# Patient Record
Sex: FEMALE | Race: WHITE | NOT HISPANIC OR LATINO | Employment: OTHER | URBAN - METROPOLITAN AREA
[De-identification: names, ages, dates, MRNs, and addresses within clinical notes are randomized per-mention and may not be internally consistent; named-entity substitution may affect disease eponyms.]

---

## 2017-10-26 ENCOUNTER — TRANSCRIBE ORDERS (OUTPATIENT)
Dept: LAB | Facility: HOSPITAL | Age: 57
End: 2017-10-26

## 2017-10-26 ENCOUNTER — LAB (OUTPATIENT)
Dept: LAB | Facility: HOSPITAL | Age: 57
End: 2017-10-26

## 2017-10-26 DIAGNOSIS — E28.1 HYPERSECRETION OF OVARIAN ANDROGENS: Primary | ICD-10-CM

## 2017-10-26 DIAGNOSIS — E28.1 HYPERSECRETION OF OVARIAN ANDROGENS: ICD-10-CM

## 2017-10-26 LAB — TESTOST SERPL-MCNC: <10 NG/DL

## 2017-10-26 PROCEDURE — 84403 ASSAY OF TOTAL TESTOSTERONE: CPT | Performed by: OBSTETRICS & GYNECOLOGY

## 2017-10-26 PROCEDURE — 36415 COLL VENOUS BLD VENIPUNCTURE: CPT

## 2017-10-26 PROCEDURE — 84402 ASSAY OF FREE TESTOSTERONE: CPT | Performed by: OBSTETRICS & GYNECOLOGY

## 2017-10-27 LAB — TESTOST FREE SERPL-MCNC: <0.2 PG/ML (ref 0–4.2)

## 2018-01-03 ENCOUNTER — HOSPITAL ENCOUNTER (OUTPATIENT)
Facility: HOSPITAL | Age: 58
Setting detail: OBSERVATION
Discharge: HOME | End: 2018-01-03
Attending: EMERGENCY MEDICINE | Admitting: HOSPITALIST

## 2018-01-03 ENCOUNTER — APPOINTMENT (OUTPATIENT)
Dept: CARDIOLOGY | Facility: HOSPITAL | Age: 58
End: 2018-01-03

## 2018-01-03 ENCOUNTER — APPOINTMENT (OUTPATIENT)
Dept: GENERAL RADIOLOGY | Facility: HOSPITAL | Age: 58
End: 2018-01-03

## 2018-01-03 VITALS
SYSTOLIC BLOOD PRESSURE: 121 MMHG | DIASTOLIC BLOOD PRESSURE: 79 MMHG | WEIGHT: 137.38 LBS | HEIGHT: 64 IN | RESPIRATION RATE: 16 BRPM | OXYGEN SATURATION: 96 % | TEMPERATURE: 97.8 F | HEART RATE: 107 BPM | BODY MASS INDEX: 23.45 KG/M2

## 2018-01-03 DIAGNOSIS — R07.9 CHEST PAIN, UNSPECIFIED TYPE: Primary | ICD-10-CM

## 2018-01-03 PROBLEM — K31.84 GASTROPARESIS: Status: ACTIVE | Noted: 2018-01-03

## 2018-01-03 PROBLEM — Z72.0 TOBACCO ABUSE: Status: ACTIVE | Noted: 2018-01-03

## 2018-01-03 PROBLEM — K21.9 GERD (GASTROESOPHAGEAL REFLUX DISEASE): Status: ACTIVE | Noted: 2018-01-03

## 2018-01-03 LAB
ALBUMIN SERPL-MCNC: 4.3 G/DL (ref 3.2–4.8)
ALBUMIN/GLOB SERPL: 1.5 G/DL (ref 1.5–2.5)
ALP SERPL-CCNC: 69 U/L (ref 25–100)
ALT SERPL W P-5'-P-CCNC: 27 U/L (ref 7–40)
ANION GAP SERPL CALCULATED.3IONS-SCNC: 6 MMOL/L (ref 3–11)
ANION GAP SERPL CALCULATED.3IONS-SCNC: 9 MMOL/L (ref 3–11)
ARTICHOKE IGE QN: 192 MG/DL (ref 0–130)
AST SERPL-CCNC: 33 U/L (ref 0–33)
BASOPHILS # BLD AUTO: 0.01 10*3/MM3 (ref 0–0.2)
BASOPHILS NFR BLD AUTO: 0.1 % (ref 0–1)
BILIRUB SERPL-MCNC: 0.2 MG/DL (ref 0.3–1.2)
BNP SERPL-MCNC: 8 PG/ML (ref 0–100)
BUN BLD-MCNC: 8 MG/DL (ref 9–23)
BUN BLD-MCNC: 8 MG/DL (ref 9–23)
BUN/CREAT SERPL: 11.4 (ref 7–25)
BUN/CREAT SERPL: 11.4 (ref 7–25)
CALCIUM SPEC-SCNC: 9.2 MG/DL (ref 8.7–10.4)
CALCIUM SPEC-SCNC: 9.4 MG/DL (ref 8.7–10.4)
CHLORIDE SERPL-SCNC: 101 MMOL/L (ref 99–109)
CHLORIDE SERPL-SCNC: 103 MMOL/L (ref 99–109)
CHOLEST SERPL-MCNC: 282 MG/DL (ref 0–200)
CO2 SERPL-SCNC: 28 MMOL/L (ref 20–31)
CO2 SERPL-SCNC: 30 MMOL/L (ref 20–31)
CREAT BLD-MCNC: 0.7 MG/DL (ref 0.6–1.3)
CREAT BLD-MCNC: 0.7 MG/DL (ref 0.6–1.3)
DEPRECATED RDW RBC AUTO: 47 FL (ref 37–54)
EOSINOPHIL # BLD AUTO: 0.18 10*3/MM3 (ref 0–0.3)
EOSINOPHIL NFR BLD AUTO: 2.2 % (ref 0–3)
ERYTHROCYTE [DISTWIDTH] IN BLOOD BY AUTOMATED COUNT: 12.9 % (ref 11.3–14.5)
GFR SERPL CREATININE-BSD FRML MDRD: 86 ML/MIN/1.73
GFR SERPL CREATININE-BSD FRML MDRD: 86 ML/MIN/1.73
GLOBULIN UR ELPH-MCNC: 2.9 GM/DL
GLUCOSE BLD-MCNC: 121 MG/DL (ref 70–100)
GLUCOSE BLD-MCNC: 137 MG/DL (ref 70–100)
HBA1C MFR BLD: 6.1 % (ref 4.8–5.6)
HCT VFR BLD AUTO: 38.6 % (ref 34.5–44)
HDLC SERPL-MCNC: 47 MG/DL (ref 40–60)
HGB BLD-MCNC: 12 G/DL (ref 11.5–15.5)
HIV1+2 AB SER QL: NORMAL
HOLD SPECIMEN: NORMAL
HOLD SPECIMEN: NORMAL
IMM GRANULOCYTES # BLD: 0.02 10*3/MM3 (ref 0–0.03)
IMM GRANULOCYTES NFR BLD: 0.2 % (ref 0–0.6)
LIPASE SERPL-CCNC: 23 U/L (ref 6–51)
LYMPHOCYTES # BLD AUTO: 2.76 10*3/MM3 (ref 0.6–4.8)
LYMPHOCYTES NFR BLD AUTO: 33.1 % (ref 24–44)
MCH RBC QN AUTO: 30.8 PG (ref 27–31)
MCHC RBC AUTO-ENTMCNC: 31.1 G/DL (ref 32–36)
MCV RBC AUTO: 99.2 FL (ref 80–99)
MONOCYTES # BLD AUTO: 0.77 10*3/MM3 (ref 0–1)
MONOCYTES NFR BLD AUTO: 9.2 % (ref 0–12)
NEUTROPHILS # BLD AUTO: 4.61 10*3/MM3 (ref 1.5–8.3)
NEUTROPHILS NFR BLD AUTO: 55.2 % (ref 41–71)
PLATELET # BLD AUTO: 354 10*3/MM3 (ref 150–450)
PMV BLD AUTO: 10 FL (ref 6–12)
POTASSIUM BLD-SCNC: 4 MMOL/L (ref 3.5–5.5)
POTASSIUM BLD-SCNC: 4.7 MMOL/L (ref 3.5–5.5)
PROT SERPL-MCNC: 7.2 G/DL (ref 5.7–8.2)
RBC # BLD AUTO: 3.89 10*6/MM3 (ref 3.89–5.14)
SODIUM BLD-SCNC: 138 MMOL/L (ref 132–146)
SODIUM BLD-SCNC: 139 MMOL/L (ref 132–146)
TRIGL SERPL-MCNC: 480 MG/DL (ref 0–150)
TROPONIN I SERPL-MCNC: 0 NG/ML (ref 0–0.07)
TROPONIN I SERPL-MCNC: 0.01 NG/ML (ref 0–0.07)
TROPONIN I SERPL-MCNC: <0.006 NG/ML
TSH SERPL DL<=0.05 MIU/L-ACNC: 4.22 MIU/ML (ref 0.35–5.35)
WBC NRBC COR # BLD: 8.35 10*3/MM3 (ref 3.5–10.8)
WHOLE BLOOD HOLD SPECIMEN: NORMAL
WHOLE BLOOD HOLD SPECIMEN: NORMAL

## 2018-01-03 PROCEDURE — 85025 COMPLETE CBC W/AUTO DIFF WBC: CPT | Performed by: EMERGENCY MEDICINE

## 2018-01-03 PROCEDURE — 84484 ASSAY OF TROPONIN QUANT: CPT | Performed by: NURSE PRACTITIONER

## 2018-01-03 PROCEDURE — 84484 ASSAY OF TROPONIN QUANT: CPT

## 2018-01-03 PROCEDURE — 93306 TTE W/DOPPLER COMPLETE: CPT

## 2018-01-03 PROCEDURE — G0432 EIA HIV-1/HIV-2 SCREEN: HCPCS | Performed by: HOSPITALIST

## 2018-01-03 PROCEDURE — 83880 ASSAY OF NATRIURETIC PEPTIDE: CPT | Performed by: EMERGENCY MEDICINE

## 2018-01-03 PROCEDURE — G0378 HOSPITAL OBSERVATION PER HR: HCPCS

## 2018-01-03 PROCEDURE — 96372 THER/PROPH/DIAG INJ SC/IM: CPT

## 2018-01-03 PROCEDURE — 93010 ELECTROCARDIOGRAM REPORT: CPT | Performed by: INTERNAL MEDICINE

## 2018-01-03 PROCEDURE — 83690 ASSAY OF LIPASE: CPT | Performed by: EMERGENCY MEDICINE

## 2018-01-03 PROCEDURE — 96374 THER/PROPH/DIAG INJ IV PUSH: CPT

## 2018-01-03 PROCEDURE — 80061 LIPID PANEL: CPT | Performed by: NURSE PRACTITIONER

## 2018-01-03 PROCEDURE — 93005 ELECTROCARDIOGRAM TRACING: CPT

## 2018-01-03 PROCEDURE — 80048 BASIC METABOLIC PNL TOTAL CA: CPT | Performed by: NURSE PRACTITIONER

## 2018-01-03 PROCEDURE — 93005 ELECTROCARDIOGRAM TRACING: CPT | Performed by: EMERGENCY MEDICINE

## 2018-01-03 PROCEDURE — 80053 COMPREHEN METABOLIC PANEL: CPT | Performed by: EMERGENCY MEDICINE

## 2018-01-03 PROCEDURE — 25010000002 ENOXAPARIN PER 10 MG: Performed by: NURSE PRACTITIONER

## 2018-01-03 PROCEDURE — 99285 EMERGENCY DEPT VISIT HI MDM: CPT

## 2018-01-03 PROCEDURE — 83036 HEMOGLOBIN GLYCOSYLATED A1C: CPT | Performed by: NURSE PRACTITIONER

## 2018-01-03 PROCEDURE — 71045 X-RAY EXAM CHEST 1 VIEW: CPT

## 2018-01-03 PROCEDURE — 93306 TTE W/DOPPLER COMPLETE: CPT | Performed by: INTERNAL MEDICINE

## 2018-01-03 PROCEDURE — 84443 ASSAY THYROID STIM HORMONE: CPT | Performed by: NURSE PRACTITIONER

## 2018-01-03 PROCEDURE — 99220 PR INITIAL OBSERVATION CARE/DAY 70 MINUTES: CPT | Performed by: INTERNAL MEDICINE

## 2018-01-03 RX ORDER — SODIUM CHLORIDE 0.9 % (FLUSH) 0.9 %
1-10 SYRINGE (ML) INJECTION AS NEEDED
Status: DISCONTINUED | OUTPATIENT
Start: 2018-01-03 | End: 2018-01-03 | Stop reason: HOSPADM

## 2018-01-03 RX ORDER — ONDANSETRON 2 MG/ML
4 INJECTION INTRAMUSCULAR; INTRAVENOUS EVERY 6 HOURS PRN
Status: DISCONTINUED | OUTPATIENT
Start: 2018-01-03 | End: 2018-01-03 | Stop reason: HOSPADM

## 2018-01-03 RX ORDER — ALUMINA, MAGNESIA, AND SIMETHICONE 2400; 2400; 240 MG/30ML; MG/30ML; MG/30ML
15 SUSPENSION ORAL ONCE
Status: COMPLETED | OUTPATIENT
Start: 2018-01-03 | End: 2018-01-03

## 2018-01-03 RX ORDER — ACETAMINOPHEN,DIPHENHYDRAMINE HCL 500; 25 MG/1; MG/1
1 TABLET, FILM COATED ORAL NIGHTLY PRN
Status: ON HOLD | COMMUNITY
End: 2020-02-11

## 2018-01-03 RX ORDER — ASPIRIN 81 MG/1
162 TABLET, CHEWABLE ORAL ONCE
Status: COMPLETED | OUTPATIENT
Start: 2018-01-03 | End: 2018-01-03

## 2018-01-03 RX ORDER — OXYCODONE AND ACETAMINOPHEN 10; 325 MG/1; MG/1
1 TABLET ORAL EVERY 6 HOURS PRN
Status: DISCONTINUED | OUTPATIENT
Start: 2018-01-03 | End: 2018-01-03 | Stop reason: HOSPADM

## 2018-01-03 RX ORDER — IMIPRAMINE HCL 25 MG
100 TABLET ORAL NIGHTLY
Status: DISCONTINUED | OUTPATIENT
Start: 2018-01-03 | End: 2018-01-03 | Stop reason: HOSPADM

## 2018-01-03 RX ORDER — FAMOTIDINE 10 MG/ML
20 INJECTION, SOLUTION INTRAVENOUS ONCE
Status: COMPLETED | OUTPATIENT
Start: 2018-01-03 | End: 2018-01-03

## 2018-01-03 RX ORDER — PANTOPRAZOLE SODIUM 40 MG/1
40 TABLET, DELAYED RELEASE ORAL DAILY
Status: DISCONTINUED | OUTPATIENT
Start: 2018-01-03 | End: 2018-01-03 | Stop reason: HOSPADM

## 2018-01-03 RX ORDER — CLONAZEPAM 1 MG/1
2 TABLET ORAL NIGHTLY
Status: DISCONTINUED | OUTPATIENT
Start: 2018-01-03 | End: 2018-01-03 | Stop reason: HOSPADM

## 2018-01-03 RX ORDER — SODIUM CHLORIDE 0.9 % (FLUSH) 0.9 %
10 SYRINGE (ML) INJECTION AS NEEDED
Status: DISCONTINUED | OUTPATIENT
Start: 2018-01-03 | End: 2018-01-03 | Stop reason: HOSPADM

## 2018-01-03 RX ORDER — PANTOPRAZOLE SODIUM 40 MG/1
40 TABLET, DELAYED RELEASE ORAL DAILY
COMMUNITY
End: 2019-02-24

## 2018-01-03 RX ORDER — ACETAMINOPHEN 325 MG/1
650 TABLET ORAL EVERY 6 HOURS PRN
Status: DISCONTINUED | OUTPATIENT
Start: 2018-01-03 | End: 2018-01-03 | Stop reason: HOSPADM

## 2018-01-03 RX ORDER — ONDANSETRON 4 MG/1
4 TABLET, ORALLY DISINTEGRATING ORAL EVERY 8 HOURS PRN
COMMUNITY

## 2018-01-03 RX ORDER — CLONAZEPAM 1 MG/1
2 TABLET ORAL DAILY
Status: DISCONTINUED | OUTPATIENT
Start: 2018-01-03 | End: 2018-01-03

## 2018-01-03 RX ORDER — ASPIRIN 81 MG/1
324 TABLET, CHEWABLE ORAL ONCE
Status: DISCONTINUED | OUTPATIENT
Start: 2018-01-03 | End: 2018-01-03 | Stop reason: DRUGHIGH

## 2018-01-03 RX ADMIN — LIDOCAINE HYDROCHLORIDE 15 ML: 20 SOLUTION ORAL; TOPICAL at 01:35

## 2018-01-03 RX ADMIN — ACETAMINOPHEN 650 MG: 325 TABLET, FILM COATED ORAL at 08:30

## 2018-01-03 RX ADMIN — OXYCODONE HYDROCHLORIDE AND ACETAMINOPHEN 1 TABLET: 10; 325 TABLET ORAL at 10:43

## 2018-01-03 RX ADMIN — PANTOPRAZOLE SODIUM 40 MG: 40 TABLET, DELAYED RELEASE ORAL at 08:30

## 2018-01-03 RX ADMIN — ASPIRIN 81 MG 162 MG: 81 TABLET ORAL at 01:28

## 2018-01-03 RX ADMIN — ALUMINUM HYDROXIDE, MAGNESIUM HYDROXIDE, AND DIMETHICONE 15 ML: 400; 400; 40 SUSPENSION ORAL at 01:35

## 2018-01-03 RX ADMIN — NITROGLYCERIN 1 INCH: 20 OINTMENT TOPICAL at 01:30

## 2018-01-03 RX ADMIN — FAMOTIDINE 20 MG: 10 INJECTION INTRAVENOUS at 01:31

## 2018-01-03 RX ADMIN — ENOXAPARIN SODIUM 40 MG: 40 INJECTION SUBCUTANEOUS at 05:59

## 2018-01-03 NOTE — PROGRESS NOTES
Discharge Planning Assessment  [facility]     Patient Name: Dawna Jolly  MRN: [id number]  Today's Date: 1/3/2018    Admit Date: 1/3/2018          Discharge Needs Assessment       01/03/18 1006    Living Environment    Lives With spouse    Living Arrangements house    Home Accessibility no concerns    Living Environment Comment Supportive spouse    Discharge Needs Assessment    Community Agency Name(S) No HH involved    Equipment Currently Used at Home none    Equipment Needed After Discharge none            Discharge Plan       01/03/18 1006    Case Management/Social Work Plan    Plan Home at DC    Patient/Family In Agreement With Plan yes    Additional Comments I spoke with the pt. She has no DC needs.        Discharge Placement     No information found        Expected Discharge Date and Time     Expected Discharge Date Expected Discharge Time    Jan 4, 2018               Demographic Summary     None            Functional Status       01/03/18 1005    Functional Status Prior    Ambulation 0-->independent    Transferring 0-->independent    Toileting 0-->independent    Bathing 0-->independent    Dressing 0-->independent    Eating 0-->independent    IADL    Medications independent    Meal Preparation independent    Housekeeping independent    Laundry independent    Shopping independent    Oral Care independent            Psychosocial     None            Abuse/Neglect     None            Legal     None            Substance Abuse     None            Patient Forms     None          Nayana Vargas RN

## 2018-01-03 NOTE — PLAN OF CARE
Problem: Health Knowledge, Opportunity for Enhanced (Adult,NICU,Mentor,Obstetrics,Pediatric)  Goal: Identify Related Risk Factors and Signs and Symptoms  Outcome: Ongoing (interventions implemented as appropriate)   18 0501   Health Knowledge, Opportunity for Enhanced   Health Knowledge, Opportunity for Enhanced: Related Risk Factors fatigue   Signs and Symptoms (Health Knowledge Enhance) information requested     Goal: Knowledgeable about Health Subject/Topic  Outcome: Ongoing (interventions implemented as appropriate)   18 050   Health Knowledge, Opportunity for Enhanced (Adult,NICU,,Obstetrics,Pediatric)   Knowledgeable about Health Subject/Topic making progress toward outcome       Problem: Patient Care Overview (Adult)  Goal: Plan of Care Review  Outcome: Ongoing (interventions implemented as appropriate)   18 0435 18 050   Patient Care Overview   Progress --  improving   Outcome Evaluation   Outcome Summary/Follow up Plan --  no c/o chest pain. VSS. Will continue to monitor.   Coping/Psychosocial Response Interventions   Plan Of Care Reviewed With patient;spouse --      Goal: Adult Individualization and Mutuality  Outcome: Ongoing (interventions implemented as appropriate)    Goal: Discharge Needs Assessment  Outcome: Ongoing (interventions implemented as appropriate)   18 0431 18 0436   Living Environment   Transportation Available --  car;family or friend will provide   Self-Care   Equipment Currently Used at Home other (see comments)  (tens unit, BP cuff) --

## 2018-01-03 NOTE — DISCHARGE SUMMARY
[facility] Medicine Services  DISCHARGE SUMMARY    Patient Name: Dawna Jolly  : 1960  MRN: [id number]    Date of Admission: 1/3/2018  Date of Discharge:  Cody 3, 2018  Primary Care Physician:  Tara Camp MD - [place], [place]    Consults     No orders found from 2017 to 2018.        Hospital Course     Presenting Problem:   Chest pain, unspecified type [R07.9]    Active Hospital Problems (** Indicates Principal Problem)    Diagnosis Date Noted   • **Chest pain [R07.9] 2016   • GERD (gastroesophageal reflux disease) [K21.9] 2018   • Gastroparesis [K31.84] 2018   • Tobacco abuse [Z72.0] 2018   • VHD (valvular heart disease) [I38] 2016   • Fibromyalgia [M79.7] 2016   • Crohn's disease [K50.90] 2016   • Rheumatoid arthritis [M06.9] 2016   • Hyperlipidemia [E78.5] 2016   • History of rheumatic fever [Z86.79] 2016   • Symptomatic PVCs [I49.3] 2016      Resolved Hospital Problems    Diagnosis Date Noted Date Resolved   No resolved problems to display.   History of PTSD  History of Self Abuse  History of Severe Depression  History of Sleep Disorder  Chronic Pain / Chronic Anxiety  Gastritis  Probable Immunosupression on [facility] Course:  Dawna Jolly is a 57 y.o. female who has seen Dr. Wesley in the past with a stress test in  and history of PVCs who presented with atypical chest pain in the center of her low chest in the zyphoid region.  She relays that she has had multiple GI issues recently including gastritis, UC, and gastroparesis diagnosed recently with Dr. Hinojosa by EGD.  She was supposed to have a gastric emptying study but has not had it.  She was found to dehydrated with a heart rate in the 101 range, sinus tachycardia.  She says her heart rate is always high.  She says she had false positive testing in the past for HIV.  She asked me to add on testing for HIV on the day of discharge.  I  encouraged her to increase her oral intake of water as she appears clinically dehydrated.        Day of Discharge     HPI: wants to go home.  Asking to repeat HIV testing    Review of Systems  Gen- No fevers, chills  CV- No chest pain, palpitations  Resp- No cough, dyspnea  GI- No N/V/D, abd pain    Otherwise ROS is negative except as mentioned in the HPI.    Vital Signs:   Temp:  [97.8 °F (36.6 °C)-98.1 °F (36.7 °C)] 97.8 °F (36.6 °C)  Heart Rate:  [] 107  Resp:  [16-18] 16  BP: (101-122)/(61-82) 121/79     Physical Exam:    Gen:  NAD  HEENT:  No JVD, dry tongue  CVS:  Tachycardic rate, s1 and s2  Lungs:  Clear  Abdomen:  Soft, NT, ND, bs+  Ext:  No pedal edema    Pertinent  and/or Most Recent Results       Results from last 7 days  Lab Units 01/03/18  0425 01/03/18  0059   WBC 10*3/mm3  --  8.35   HEMOGLOBIN g/dL  --  12.0   HEMATOCRIT %  --  38.6   PLATELETS 10*3/mm3  --  354   SODIUM mmol/L 138 139   POTASSIUM mmol/L 4.0 4.7   CHLORIDE mmol/L 101 103   CO2 mmol/L 28.0 30.0   BUN mg/dL 8* 8*   CREATININE mg/dL 0.70 0.70   GLUCOSE mg/dL 121* 137*   CALCIUM mg/dL 9.4 9.2       Results from last 7 days  Lab Units 01/03/18  0059   BILIRUBIN mg/dL 0.2*   ALK PHOS U/L 69   ALT (SGPT) U/L 27   AST (SGOT) U/L 33       Results from last 7 days  Lab Units 01/03/18  0425   CHOLESTEROL mg/dL 282*   TRIGLYCERIDES mg/dL 480*   HDL CHOL mg/dL 47   LDL CHOL mg/dL 192*       Results from last 7 days  Lab Units 01/03/18  0425 01/03/18  0059   TSH mIU/mL 4.217  --    HEMOGLOBIN A1C % 6.10*  --    BNP pg/mL  --  8.0   TROPONIN I ng/mL <0.006  --      Brief Urine Lab Results     None          Microbiology Results Abnormal     None          Imaging Results (all)     Procedure Component Value Units Date/Time    XR Chest 1 View [[id number]] Collected:  01/03/18 0042     Updated:  01/03/18 0149    Narrative:       EXAM:    XR Chest, 1 View    CLINICAL HISTORY:    57 years old, female; Pain; Chest pain; Type not specified; Additional  info:   Chest pain triage protocol    TECHNIQUE:    Frontal view of the chest.    COMPARISON:    CR - AX-CHEST PA AND LATERAL 2015 10:54    FINDINGS:    Lungs:  Unremarkable.  No consolidation.    Pleural space:  Unremarkable.  No pneumothorax.    Heart:  Unremarkable.  No cardiomegaly.    Mediastinum:  Unremarkable.    Bones/joints:  Unremarkable.    Other findings:  There has been cervical  anterior plate-and-screw fusion.      Impression:         No acute findings.    THIS DOCUMENT HAS BEEN ELECTRONICALLY SIGNED BY JUSTICE VALDES MD          Results for orders placed during the hospital encounter of 06/17/15   Echo - Converted    Narrative Patient:      HONG COLMENARES    [facility] Rec#:     [id number]               :          1960            Date:         2015            Age:          55y                   Height:       162.56 cm / 64.0 in  Weight:       53.98 kg / 119.0 lbs  Sex:          F                     BSA:          1.57  Room#:        OP                        Sonographer:  Miri Louis [facility]  Referring:    [facility]  Reading:      Americo Wesley MD, [facility], [facility]  Primary:      Tara Camp  ______________________________________________________________________    Transthoracic Echocardiogram    Indication:  CP  BP:           102/65    Conclusions  1. A trivial pericardial effusion is visualized.  2. No other abnormalities are seen.    Findings       Left Ventricle:  The left ventricular chamber size is normal. Global left ventricular  wall motion and contractility are within normal limits. There is normal  left ventricular systolic function. The estimated ejection fraction is  55-60%.      Left Atrium:  The left atrial chamber size is normal.     Right Ventricle:  The right ventricular cavity size is normal. The right ventricular  global systolic function is normal.     Right Atrium:  The right atrial cavity size is normal. No atrial septal defect is  visualized.     Aortic  Valve:  The aortic valve is trileaflet. There is no evidence of aortic  regurgitation. There is no evidence of aortic stenosis.     Mitral Valve:  The mitral valve leaflets appear normal. There is no evidence of mitral  valve prolapse. There is mild mitral regurgitation.  There is no  evidence of mitral stenosis.     Tricuspid Valve:  The tricuspid valve leaflets are normal.  There is mild tricuspid  regurgitation.     Pulmonic Valve:  The pulmonic valve appears normal. There is a trace pulmonic  regurgitation.      Pericardium:  A trivial pericardial effusion is visualized. The pericardial effusion  is seen adjacent to the right ventricle.     Aorta:  There is no dilatation of the aortic root.     Pulmonary Artery:  The main pulmonary artery appears normal.     Venous:  The venous system appears normal.     Measurements   Chambers  Name                    Value           RVIDd (AP) 2D           1.85 cm         IVSd (2D)               0.82 cm         LVIDd (2D)              4.3 cm          LVIDs (2D)              2.7 cm          LVPWd (2D)              0.72 cm         EF (2D)                 67.5 %          Ao root diameter (2D)   2.5 cm          LA dimension (AP) 2D    1.7 cm          LA:Ao ratio (2D)        0.68 ratio        Volumes  Name                    Value           LA ESV SP 4CH (MOD)     26 ml           LV EDV SP 4CH (MOD)     34 ml           LV ESV SP 4CH (MOD)     11 ml           EF SP 4CH (MOD)         65 %            LV EDV SP 2CH (MOD)     34 ml           LV ESV SP 2CH (MOD)     8 ml            EF SP 2CH (MOD)         76 %            LV EDV BP               35 ml           LV ESV BP               10 ml           BP EF (MOD)             71 %              Diastolic/Systolic Function  Name                    Value           MV E-wave Vmax          0.58 m/sec      MV A-wave Vmax          0.64 m/sec      MV E:A ratio            0.9 ratio       LV septal e' Vmax       0.09 m/sec      LV lateral e' Vmax       0.12 m/sec      LV E:e' septal ratio    6.2 ratio       LV E:e' lateral ratio   4.9 ratio         Tricuspid Valve  Name                    Value           TR Vmax                 1.76 m/sec      TR peak gradient        12 mmHg         RAP                     10 mmHg         RVSP                    22 mmHg           Pulmonic Valve/Qp:Qs  Name                    Value           PV acceleration time    144 msec          Electronically signed by: Americo Wesley MD, [facility], [facility] on 06/18/2015  10:59:46         Discharge Details      Dawna Jolly   Home Medication Instructions MYNOR:[id number]    Printed on:01/03/18 1242   Medication Information                      adalimumab (HUMIRA) 40 MG/0.8ML Prefilled Syringe Kit injection  Inject 40 mg under the skin 2 (Two) Times a Week.             clonazePAM (KlonoPIN) 2 MG tablet  Take 2 mg by mouth Daily.             diphenhydrAMINE-acetaminophen (TYLENOL PM)  MG tablet per tablet  Take 1 tablet by mouth At Night As Needed for Sleep.             imipramine (TOFRANIL-PM) 100 MG capsule  Take 100 mg by mouth Every Night.             ondansetron ODT (ZOFRAN-ODT) 4 MG disintegrating tablet  Take 4 mg by mouth Every 8 (Eight) Hours As Needed for Nausea or Vomiting.             oxyCODONE-acetaminophen (PERCOCET)  MG per tablet  Take 1 tablet by mouth 2 (Two) Times a Day.             pantoprazole (PROTONIX) 40 MG EC tablet  Take 40 mg by mouth Daily.             tiZANidine (ZANAFLEX) 4 MG tablet  Take 4 mg by mouth 3 (Three) Times a Day.               Discharge Disposition:  Home or Self Care    Discharge Diet:  Cardiac diet      Discharge Activity: as tolerated    Special Instructions:   Suggested follow up with Dr. Wesley to discuss possible need for repeat stress test    No future appointments.    Additional Instructions for the Follow-ups that You Need to Schedule     Discharge Follow-up with PCP    As directed    Follow Up Details:  Primary Care Physician - 1  week - results of HIV testing - refills           Discharge Follow-up with Specialty: Cardiology - Dr. Americo Wesley; 2 Weeks    As directed    Specialty:  Cardiology - Dr. Americo Wesley    Follow Up:  2 Weeks    Follow Up Details:  Dr. NICOLE Wesley - may want to consider repeat stress testing                   Patient asked me to add on HIV testing to labs today on the day of discharge.  This will need to be followed.    Time Spent on Discharge:  39 mins    Jason Torres MD  01/03/18  12:42 PM

## 2018-01-03 NOTE — H&P
[facility] Medicine Services  HISTORY AND PHYSICAL    Patient Name: Dawna Jolly  : 1960  MRN: [id number]  Primary Care Physician: No Known Provider    Subjective   Subjective     Chief Complaint:  Chest pain    HPI:  Dawna Jolly is a 57 y.o. female with PMH significant for anxiety, atypical chest pain, Crohn's Disease, fibromyalgia, Rheumatoid arthritis, and VHD that presents to the ED with complaint of acute onset chest pain that started this evening while at rest. She states that the chest pain was located lower mid sternum and is described as pressure. It did radiate to her back. She did notice some mild throat pain during the episode as well. She notes that she took 2 ASA while at home which seemed to help decrease her pain. Currently she rates it a 1.5/10. She denies any N/V/D, SOA, or diaphoresis.   She will be admitted to Hospital Medicine for further evaluation.     58 YO FEMALE W/ HX OF HYPERLIPIDEMIA, TOBACCO ABUSE, FAM HX OF CARDIAC DEATH IN FATHER WHO PRESENTS W/ EPIGASTRIC PAIN WITH SOME RADIATION TO BACK BUT NO N/V/DIAPHORESIS/DYSPNEA; DID NOTE THROAT FELT UNCOMFORTABLE AS WELL.  RATES PAIN CURRENTLY 1.5/10.      Review of Systems   Constitutional: Negative for activity change, appetite change, chills, diaphoresis, fatigue and fever.   Respiratory: Negative for cough, shortness of breath and wheezing.    Cardiovascular: Positive for chest pain. Negative for palpitations and leg swelling.   Gastrointestinal: Negative for abdominal pain, constipation, diarrhea, nausea and vomiting.   Genitourinary: Negative for difficulty urinating, dysuria and urgency.   Musculoskeletal: Positive for arthralgias and myalgias.        Chronic   Skin: Negative for color change and pallor.   Neurological: Positive for headaches. Negative for dizziness, weakness, light-headedness and numbness.   Psychiatric/Behavioral: Negative for confusion. The patient is nervous/anxious.         Otherwise 10-system ROS reviewed and is negative except as mentioned in the HPI.    Personal History     Past Medical History:   Diagnosis Date   • Anxiety 2016   • Atypical chest pain 2016   • Chronic neck pain 2016   • Crohn's disease 2016   • Fibromyalgia 2016   • History of rheumatic fever 2016   • Hyperlipidemia 2016   • Rheumatoid arthritis 2016   • Symptomatic PVCs 2016   • VHD (valvular heart disease) 2016       Past Surgical History:   Procedure Laterality Date   • BREAST LUMPECTOMY Bilateral    •  SECTION      x2       Family History: family history includes Heart attack in her father; Sudden death in her father.     Social History:  reports that she quit smoking about 23 years ago. She has never used smokeless tobacco. She reports that she does not drink alcohol or use illicit drugs. STARTED SMOKING E-CIG'S 3 MG CURRENTLY FOR LAST 7 YEARS.    Social History     Social History Narrative       Medications:    (Not in a hospital admission)    Allergies   Allergen Reactions   • Morphine And Related    • Penicillins        Objective   Objective     Vital Signs:   Temp:  [98.1 °F (36.7 °C)] 98.1 °F (36.7 °C)  Heart Rate:  [] 97  Resp:  [16] 16  BP: (111-122)/(66-79) 111/79        Physical Exam   Constitutional: She is oriented to person, place, and time. She appears well-developed and well-nourished. No distress.   HENT:   Head: Normocephalic and atraumatic.   Eyes: Pupils are equal, round, and reactive to light.   Neck: Normal range of motion. Neck supple. No JVD present.   Cardiovascular: Normal rate, regular rhythm, normal heart sounds and intact distal pulses.   Occasional extrasystoles are present. Exam reveals no gallop and no friction rub.    No murmur heard.  Pulmonary/Chest: Effort normal and breath sounds normal. She has no wheezes. She has no rales.   Abdominal: Soft. Bowel sounds are normal. She exhibits no distension and no mass.  There is no tenderness. There is no guarding.   Musculoskeletal: Normal range of motion. She exhibits no edema or tenderness.   Neurological: She is alert and oriented to person, place, and time.   Skin: Skin is warm and dry. No erythema. No pallor.   Psychiatric: She has a normal mood and affect. Her behavior is normal. Thought content normal.   Vitals reviewed.    PRESENT AND NO CHANGE TO THE ABOVE    Results Reviewed:  I have personally reviewed current lab, radiology, and data and agree.      Results from last 7 days  Lab Units 01/03/18  0059   WBC 10*3/mm3 8.35   HEMOGLOBIN g/dL 12.0   HEMATOCRIT % 38.6   PLATELETS 10*3/mm3 354       Results from last 7 days  Lab Units 01/03/18  0059   SODIUM mmol/L 139   POTASSIUM mmol/L 4.7   CHLORIDE mmol/L 103   CO2 mmol/L 30.0   BUN mg/dL 8*   CREATININE mg/dL 0.70   GLUCOSE mg/dL 137*   CALCIUM mg/dL 9.2   ALT (SGPT) U/L 27   AST (SGOT) U/L 33     Brief Urine Lab Results     None        BNP   Date Value Ref Range Status   01/03/2018 8.0 0.0 - 100.0 pg/mL Final     No results found for: PHART  Imaging Results (last 24 hours)     Procedure Component Value Units Date/Time    XR Chest 1 View [[id number]] Collected:  01/03/18 0042     Updated:  01/03/18 0149    Narrative:       EXAM:    XR Chest, 1 View    CLINICAL HISTORY:    57 years old, female; Pain; Chest pain; Type not specified; Additional info:   Chest pain triage protocol    TECHNIQUE:    Frontal view of the chest.    COMPARISON:    CR - AX-CHEST PA AND LATERAL 2015-05-28 10:54    FINDINGS:    Lungs:  Unremarkable.  No consolidation.    Pleural space:  Unremarkable.  No pneumothorax.    Heart:  Unremarkable.  No cardiomegaly.    Mediastinum:  Unremarkable.    Bones/joints:  Unremarkable.    Other findings:  There has been cervical  anterior plate-and-screw fusion.      Impression:         No acute findings.    THIS DOCUMENT HAS BEEN ELECTRONICALLY SIGNED BY JUSTICE VALDES MD        Results for orders placed during the  hospital encounter of 06/17/15   Echo - Converted    Narrative Patient:      HONG COLMENARES    [facility] Rec#:     [id number]               :          1960            Date:         2015            Age:          55y                   Height:       162.56 cm / 64.0 in  Weight:       53.98 kg / 119.0 lbs  Sex:          F                     BSA:          1.57  Room#:        OP                        Sonographer:  Miri Louis RDCS  Referring:    ROSALBA  Reading:      Americo Wesley MD, [facility], [facility]  Primary:      Tara Camp  ______________________________________________________________________    Transthoracic Echocardiogram    Indication:  CP  BP:           102/65    Conclusions  1. A trivial pericardial effusion is visualized.  2. No other abnormalities are seen.    Findings       Left Ventricle:  The left ventricular chamber size is normal. Global left ventricular  wall motion and contractility are within normal limits. There is normal  left ventricular systolic function. The estimated ejection fraction is  55-60%.      Left Atrium:  The left atrial chamber size is normal.     Right Ventricle:  The right ventricular cavity size is normal. The right ventricular  global systolic function is normal.     Right Atrium:  The right atrial cavity size is normal. No atrial septal defect is  visualized.     Aortic Valve:  The aortic valve is trileaflet. There is no evidence of aortic  regurgitation. There is no evidence of aortic stenosis.     Mitral Valve:  The mitral valve leaflets appear normal. There is no evidence of mitral  valve prolapse. There is mild mitral regurgitation.  There is no  evidence of mitral stenosis.     Tricuspid Valve:  The tricuspid valve leaflets are normal.  There is mild tricuspid  regurgitation.     Pulmonic Valve:  The pulmonic valve appears normal. There is a trace pulmonic  regurgitation.      Pericardium:  A trivial pericardial effusion is visualized. The pericardial  effusion  is seen adjacent to the right ventricle.     Aorta:  There is no dilatation of the aortic root.     Pulmonary Artery:  The main pulmonary artery appears normal.     Venous:  The venous system appears normal.     Measurements   Chambers  Name                    Value           RVIDd (AP) 2D           1.85 cm         IVSd (2D)               0.82 cm         LVIDd (2D)              4.3 cm          LVIDs (2D)              2.7 cm          LVPWd (2D)              0.72 cm         EF (2D)                 67.5 %          Ao root diameter (2D)   2.5 cm          LA dimension (AP) 2D    1.7 cm          LA:Ao ratio (2D)        0.68 ratio        Volumes  Name                    Value           LA ESV SP 4CH (MOD)     26 ml           LV EDV SP 4CH (MOD)     34 ml           LV ESV SP 4CH (MOD)     11 ml           EF SP 4CH (MOD)         65 %            LV EDV SP 2CH (MOD)     34 ml           LV ESV SP 2CH (MOD)     8 ml            EF SP 2CH (MOD)         76 %            LV EDV BP               35 ml           LV ESV BP               10 ml           BP EF (MOD)             71 %              Diastolic/Systolic Function  Name                    Value           MV E-wave Vmax          0.58 m/sec      MV A-wave Vmax          0.64 m/sec      MV E:A ratio            0.9 ratio       LV septal e' Vmax       0.09 m/sec      LV lateral e' Vmax      0.12 m/sec      LV E:e' septal ratio    6.2 ratio       LV E:e' lateral ratio   4.9 ratio         Tricuspid Valve  Name                    Value           TR Vmax                 1.76 m/sec      TR peak gradient        12 mmHg         RAP                     10 mmHg         RVSP                    22 mmHg           Pulmonic Valve/Qp:Qs  Name                    Value           PV acceleration time    144 msec          Electronically signed by: Americo Wesley MD, [facility], [facility] on 06/18/2015  10:59:46       Assessment/Plan   Assessment / Plan     Hospital Problem List     * (Principal)Chest  pain    Overview Signed 9/27/2016  9:28 PM by Radha Davila     Complaints of left-sided sharp chest pain into her left shoulder associated some with palpitations that wake her up at night.          Symptomatic PVCs    Overview Signed 9/27/2016  9:28 PM by Radha heard Complaints of palpitations.   b. Prior workup by Dr. Adams the late 1990s with a Holter monitor revealing symptomatic PVCs.            History of rheumatic fever    Hyperlipidemia    VHD (valvular heart disease)    Overview Signed 9/27/2016  9:30 PM by Radha calvo. Per patients report history of mitral valve prolapse by echocardiogram, 1990s.            Crohn's disease    Rheumatoid arthritis    Fibromyalgia    GERD (gastroesophageal reflux disease)    Gastroparesis    Tobacco abuse            Assessment & Plan:  57 year old female presenting to the ED with complaint of acute onset chest pain while at rest. CP SEEMS ATYPICAL AND QUESTION ?ESOPHAGEAL SPASM OR GI SOURCE BUT PT DOES HAVE RISK FACTORS W/ TOBACCO ABUSE, HYPERLIPIDEMIA, FAM HX CARDIAC DEATH; EKG AND TROPONIN NORMAL.  WILL FOLLOW AND OBTAIN ECHO IN A.M. W/ LIKELY D/C TO HOME IF NORMAL.  IF ABNORMAL THEN STRESS TEST/CARDS.    Chest Pain  -ASA given in ED, will continue daily  -NTG PRN, currently has on paste  -EKG in am  -Trend troponin, so far they are negative   -ECHO in am  -Consider Cardiology consult in am vs outpt stress test pending results  -am labs: CBC, BMP, HgA1c, HLD     Continue home medications as appropriate       DVT prophylaxis:  lovenox  -Teds/scds    CODE STATUS:  Full    Admission Status:  I believe this patient meets OBSERVATION status, however if further evaluation or treatment plans warrant, status may change.  Based upon current information, I predict patient's care encounter to be less than or equal to 2 midnights.      Alexa Del Cid MD   01/03/18   3:06 AM

## 2018-01-03 NOTE — ED PROVIDER NOTES
Subjective   HPI Comments: Dawna Jolly is a 57 y.o.female with previous hx of GERD and gastroparesis who presents to the ED with c/o chest pain with onset just PTA. She reports that she suddenly developed lower midsternum chest pain. She describes her pain as a sharp and pressure type sensation. She rates her pain as a 7/10 in severity. She states that she has taken 2 baby ASA with partial relief. She notes that she spoke to a nurse over the phone who recommended her to visit the ED. Upon arrival to the ED, she rates her pain as a 3/10 in severity. She denies diaphoresis, or any other complaints at this time. She notes that she has hx of smoking and HLD. She states that she has family hx of CAD. She notes that her most recent stress test was 2015. She denies hx of MI.    Patient is a 57 y.o. female presenting with chest pain.   History provided by:  Patient  Chest Pain   Chest pain location: Lower midsternum.  Pain quality: pressure and sharp    Pain radiates to:  Does not radiate  Pain severity:  Moderate  Onset quality:  Sudden  Timing:  Constant  Progression:  Unable to specify  Chronicity:  Recurrent  Relieved by: 2 baby ASA.  Worsened by:  Nothing  Ineffective treatments:  None tried  Associated symptoms: no diaphoresis        Review of Systems   Constitutional: Negative for diaphoresis.   Cardiovascular: Positive for chest pain.   All other systems reviewed and are negative.      Past Medical History:   Diagnosis Date   • Anxiety 9/27/2016   • Atypical chest pain 9/27/2016   • Chronic neck pain 9/27/2016   • Crohn's disease 9/27/2016   • Fibromyalgia 9/27/2016   • History of rheumatic fever 9/27/2016   • Hyperlipidemia 9/27/2016   • Rheumatoid arthritis 9/27/2016   • Symptomatic PVCs 9/27/2016   • VHD (valvular heart disease) 9/27/2016       Allergies   Allergen Reactions   • Morphine And Related    • Penicillins        Past Surgical History:   Procedure Laterality Date   • BREAST LUMPECTOMY Bilateral    •   SECTION      x2       Family History   Problem Relation Age of Onset   • Heart attack Father    • Sudden death Father        Social History     Social History   • Marital status:      Spouse name: N/A   • Number of children: N/A   • Years of education: N/A     Social History Main Topics   • Smoking status: Former Smoker     Quit date:    • Smokeless tobacco: Never Used   • Alcohol use No   • Drug use: No   • Sexual activity: Defer     Other Topics Concern   • None     Social History Narrative         Objective   Physical Exam   Constitutional: She is oriented to person, place, and time. She appears well-developed and well-nourished. No distress.   HENT:   Head: Normocephalic and atraumatic.   Nose: Nose normal.   Eyes: Conjunctivae are normal. No scleral icterus.   Neck: Normal range of motion. Neck supple.   Cardiovascular: Normal rate, regular rhythm and normal heart sounds.    No murmur heard.  Pulmonary/Chest: Effort normal and breath sounds normal. No respiratory distress. She exhibits no tenderness.   Abdominal: Soft. Bowel sounds are normal. There is no tenderness.   Neurological: She is alert and oriented to person, place, and time.   Skin: Skin is warm and dry.   Psychiatric: She has a normal mood and affect. Her behavior is normal.   Nursing note and vitals reviewed.      Procedures         ED Course  ED Course   Comment By Time   Spoke to Dr. Del Cid, Hospitalist who will admit.-BRIE Calixto  0209   Heart score of 4. Shay Calixto  0215     Recent Results (from the past 24 hour(s))   Comprehensive Metabolic Panel    Collection Time: 18 12:59 AM   Result Value Ref Range    Glucose 137 (H) 70 - 100 mg/dL    BUN 8 (L) 9 - 23 mg/dL    Creatinine 0.70 0.60 - 1.30 mg/dL    Sodium 139 132 - 146 mmol/L    Potassium 4.7 3.5 - 5.5 mmol/L    Chloride 103 99 - 109 mmol/L    CO2 30.0 20.0 - 31.0 mmol/L    Calcium 9.2 8.7 - 10.4 mg/dL    Total Protein 7.2 5.7 - 8.2 g/dL     Albumin 4.30 3.20 - 4.80 g/dL    ALT (SGPT) 27 7 - 40 U/L    AST (SGOT) 33 0 - 33 U/L    Alkaline Phosphatase 69 25 - 100 U/L    Total Bilirubin 0.2 (L) 0.3 - 1.2 mg/dL    eGFR Non African Amer 86 >60 mL/min/1.73    Globulin 2.9 gm/dL    A/G Ratio 1.5 1.5 - 2.5 g/dL    BUN/Creatinine Ratio 11.4 7.0 - 25.0    Anion Gap 6.0 3.0 - 11.0 mmol/L   Lipase    Collection Time: 01/03/18 12:59 AM   Result Value Ref Range    Lipase 23 6 - 51 U/L   Light Blue Top    Collection Time: 01/03/18 12:59 AM   Result Value Ref Range    Extra Tube hold for add-on    Green Top (Gel)    Collection Time: 01/03/18 12:59 AM   Result Value Ref Range    Extra Tube Hold for add-ons.    Lavender Top    Collection Time: 01/03/18 12:59 AM   Result Value Ref Range    Extra Tube hold for add-on    Gold Top - SST    Collection Time: 01/03/18 12:59 AM   Result Value Ref Range    Extra Tube Hold for add-ons.    CBC Auto Differential    Collection Time: 01/03/18 12:59 AM   Result Value Ref Range    WBC 8.35 3.50 - 10.80 10*3/mm3    RBC 3.89 3.89 - 5.14 10*6/mm3    Hemoglobin 12.0 11.5 - 15.5 g/dL    Hematocrit 38.6 34.5 - 44.0 %    MCV 99.2 (H) 80.0 - 99.0 fL    MCH 30.8 27.0 - 31.0 pg    MCHC 31.1 (L) 32.0 - 36.0 g/dL    RDW 12.9 11.3 - 14.5 %    RDW-SD 47.0 37.0 - 54.0 fl    MPV 10.0 6.0 - 12.0 fL    Platelets 354 150 - 450 10*3/mm3    Neutrophil % 55.2 41.0 - 71.0 %    Lymphocyte % 33.1 24.0 - 44.0 %    Monocyte % 9.2 0.0 - 12.0 %    Eosinophil % 2.2 0.0 - 3.0 %    Basophil % 0.1 0.0 - 1.0 %    Immature Grans % 0.2 0.0 - 0.6 %    Neutrophils, Absolute 4.61 1.50 - 8.30 10*3/mm3    Lymphocytes, Absolute 2.76 0.60 - 4.80 10*3/mm3    Monocytes, Absolute 0.77 0.00 - 1.00 10*3/mm3    Eosinophils, Absolute 0.18 0.00 - 0.30 10*3/mm3    Basophils, Absolute 0.01 0.00 - 0.20 10*3/mm3    Immature Grans, Absolute 0.02 0.00 - 0.03 10*3/mm3   POC Troponin, Rapid    Collection Time: 01/03/18  1:04 AM   Result Value Ref Range    Troponin I 0.01 0.00 - 0.07 ng/mL  "    Note: In addition to lab results from this visit, the labs listed above may include labs taken at another facility or during a different encounter within the last 24 hours. Please correlate lab times with ED admission and discharge times for further clarification of the services performed during this visit.    XR Chest 1 View   Final Result     No acute findings.      THIS DOCUMENT HAS BEEN ELECTRONICALLY SIGNED BY JUSTICE VALDES MD        Vitals:    01/03/18 0029 01/03/18 0131 01/03/18 0200   BP: 122/66 112/71 111/79   BP Location: Left arm     Patient Position: Sitting     Pulse: 103 105 97   Resp: 16     Temp: 98.1 °F (36.7 °C)     TempSrc: Oral     SpO2: 96% 95% 95%   Weight: 61.2 kg (135 lb)     Height: 165.1 cm (65\")       Medications   sodium chloride 0.9 % flush 10 mL (not administered)   nitroglycerin (NITROSTAT) ointment 1 inch (1 inch Topical Given 1/3/18 0130)   aspirin chewable tablet 162 mg (162 mg Oral Given 1/3/18 0128)   famotidine (PEPCID) injection 20 mg (20 mg Intravenous Given 1/3/18 0131)   aluminum-magnesium hydroxide-simethicone (MAALOX MAX) 400-400-40 MG/5ML suspension 15 mL (15 mL Oral Given 1/3/18 0135)   lidocaine viscous (XYLOCAINE) 2 % mouth solution 15 mL (15 mL Mouth/Throat Given 1/3/18 0135)     ECG/EMG Results (last 24 hours)     Procedure Component Value Units Date/Time    ECG 12 Lead [[id number]] Collected:  01/03/18 0042     Updated:  01/03/18 0044                        MDM    Final diagnoses:   Chest pain, unspecified type       Documentation assistance provided by sheba Calixto.  Information recorded by the sheba was done at my direction and has been verified and validated by me.     Shay Calixto  01/03/18 0059       Shay Calixto  01/03/18 0059       Shay Calixto  01/03/18 0216       Aki Graff DO  01/07/18 1332    "

## 2018-01-04 LAB
BH CV ECHO MEAS - AO MAX PG (FULL): 0.98 MMHG
BH CV ECHO MEAS - AO MAX PG: 4 MMHG
BH CV ECHO MEAS - AO MEAN PG (FULL): 1 MMHG
BH CV ECHO MEAS - AO MEAN PG: 2.7 MMHG
BH CV ECHO MEAS - AO ROOT AREA (BSA CORRECTED): 1.8
BH CV ECHO MEAS - AO ROOT AREA: 7.3 CM^2
BH CV ECHO MEAS - AO ROOT DIAM: 3.1 CM
BH CV ECHO MEAS - AO V2 MAX: 105.3 CM/SEC
BH CV ECHO MEAS - AO V2 MEAN: 78.3 CM/SEC
BH CV ECHO MEAS - AO V2 VTI: 22.1 CM
BH CV ECHO MEAS - AVA(I,A): 2 CM^2
BH CV ECHO MEAS - AVA(I,D): 2 CM^2
BH CV ECHO MEAS - AVA(V,A): 2.4 CM^2
BH CV ECHO MEAS - AVA(V,D): 2.4 CM^2
BH CV ECHO MEAS - BSA(HAYCOCK): 1.7 M^2
BH CV ECHO MEAS - BSA: 1.7 M^2
BH CV ECHO MEAS - BZI_BMI: 23.5 KILOGRAMS/M^2
BH CV ECHO MEAS - BZI_METRIC_HEIGHT: 162.6 CM
BH CV ECHO MEAS - BZI_METRIC_WEIGHT: 62.1 KG
BH CV ECHO MEAS - EDV(CUBED): 59.9 ML
BH CV ECHO MEAS - EDV(TEICH): 66.4 ML
BH CV ECHO MEAS - EF(CUBED): 67.8 %
BH CV ECHO MEAS - EF(TEICH): 60 %
BH CV ECHO MEAS - ESV(CUBED): 19.3 ML
BH CV ECHO MEAS - ESV(TEICH): 26.6 ML
BH CV ECHO MEAS - FS: 31.5 %
BH CV ECHO MEAS - IVS/LVPW: 1
BH CV ECHO MEAS - IVSD: 0.84 CM
BH CV ECHO MEAS - LA DIMENSION: 2.3 CM
BH CV ECHO MEAS - LA/AO: 0.75
BH CV ECHO MEAS - LAT PEAK E' VEL: 8.6 CM/SEC
BH CV ECHO MEAS - LV MASS(C)D: 95 GRAMS
BH CV ECHO MEAS - LV MASS(C)DI: 57.1 GRAMS/M^2
BH CV ECHO MEAS - LV MAX PG: 3 MMHG
BH CV ECHO MEAS - LV MEAN PG: 1.7 MMHG
BH CV ECHO MEAS - LV V1 MAX: 86.9 CM/SEC
BH CV ECHO MEAS - LV V1 MEAN: 58.5 CM/SEC
BH CV ECHO MEAS - LV V1 VTI: 15.7 CM
BH CV ECHO MEAS - LVIDD: 3.9 CM
BH CV ECHO MEAS - LVIDS: 2.7 CM
BH CV ECHO MEAS - LVOT AREA (M): 2.8 CM^2
BH CV ECHO MEAS - LVOT AREA: 2.9 CM^2
BH CV ECHO MEAS - LVOT DIAM: 1.9 CM
BH CV ECHO MEAS - LVPWD: 0.83 CM
BH CV ECHO MEAS - MED PEAK E' VEL: 7.41 CM/SEC
BH CV ECHO MEAS - MV A MAX VEL: 64.7 CM/SEC
BH CV ECHO MEAS - MV E MAX VEL: 64.2 CM/SEC
BH CV ECHO MEAS - MV E/A: 0.99
BH CV ECHO MEAS - PA ACC SLOPE: 725.6 CM/SEC^2
BH CV ECHO MEAS - PA ACC TIME: 0.12 SEC
BH CV ECHO MEAS - PA MAX PG: 3.5 MMHG
BH CV ECHO MEAS - PA PR(ACCEL): 25.1 MMHG
BH CV ECHO MEAS - PA V2 MAX: 93.4 CM/SEC
BH CV ECHO MEAS - RAP SYSTOLE: 3 MMHG
BH CV ECHO MEAS - RVSP: 13 MMHG
BH CV ECHO MEAS - SI(AO): 97 ML/M^2
BH CV ECHO MEAS - SI(CUBED): 24.4 ML/M^2
BH CV ECHO MEAS - SI(LVOT): 27.1 ML/M^2
BH CV ECHO MEAS - SI(TEICH): 23.9 ML/M^2
BH CV ECHO MEAS - SV(AO): 161.6 ML
BH CV ECHO MEAS - SV(CUBED): 40.6 ML
BH CV ECHO MEAS - SV(LVOT): 45.1 ML
BH CV ECHO MEAS - SV(TEICH): 39.9 ML
BH CV ECHO MEAS - TAPSE (>1.6): 2.1 CM2
BH CV ECHO MEAS - TR MAX VEL: 158.5 CM/SEC
BH CV VAS BP RIGHT ARM: NORMAL MMHG
BH CV XLRA - RV BASE: 2.1 CM
BH CV XLRA - RV LENGTH: 6.2 CM
BH CV XLRA - RV MID: 2.1 CM
BH CV XLRA - TDI S': 12.5 CM/SEC
E/E' RATIO: 7.5
LEFT ATRIUM VOLUME INDEX: 12 ML/M2

## 2018-04-30 ENCOUNTER — TRANSCRIBE ORDERS (OUTPATIENT)
Dept: ADMINISTRATIVE | Facility: HOSPITAL | Age: 58
End: 2018-04-30

## 2018-04-30 DIAGNOSIS — K31.84 GASTROPARESIS: Primary | ICD-10-CM

## 2018-05-08 ENCOUNTER — HOSPITAL ENCOUNTER (OUTPATIENT)
Dept: NUCLEAR MEDICINE | Facility: HOSPITAL | Age: 58
Discharge: HOME | End: 2018-05-08

## 2018-05-16 ENCOUNTER — APPOINTMENT (OUTPATIENT)
Dept: NUCLEAR MEDICINE | Facility: HOSPITAL | Age: 58
End: 2018-05-16

## 2018-05-22 ENCOUNTER — HOSPITAL ENCOUNTER (OUTPATIENT)
Dept: NUCLEAR MEDICINE | Facility: HOSPITAL | Age: 58
Discharge: HOME | End: 2018-05-22

## 2018-05-22 DIAGNOSIS — K31.84 GASTROPARESIS: ICD-10-CM

## 2018-05-22 PROCEDURE — 78264 GASTRIC EMPTYING IMG STUDY: CPT

## 2018-05-22 PROCEDURE — 0 TECHNETIUM SULFUR COLLOID: Performed by: INTERNAL MEDICINE

## 2018-05-22 PROCEDURE — A9541 TC99M SULFUR COLLOID: HCPCS | Performed by: INTERNAL MEDICINE

## 2018-05-22 RX ADMIN — TECHNETIUM TC 99M SULFUR COLLOID 1 DOSE: KIT at 11:30

## 2018-10-27 ENCOUNTER — HOSPITAL ENCOUNTER (EMERGENCY)
Facility: HOSPITAL | Age: 58
Discharge: HOME | End: 2018-10-27
Attending: EMERGENCY MEDICINE | Admitting: EMERGENCY MEDICINE

## 2018-10-27 VITALS
SYSTOLIC BLOOD PRESSURE: 120 MMHG | HEART RATE: 118 BPM | BODY MASS INDEX: 21.16 KG/M2 | RESPIRATION RATE: 18 BRPM | WEIGHT: 127 LBS | HEIGHT: 65 IN | OXYGEN SATURATION: 94 % | TEMPERATURE: 99.4 F | DIASTOLIC BLOOD PRESSURE: 84 MMHG

## 2018-10-27 DIAGNOSIS — E86.0 DEHYDRATION: ICD-10-CM

## 2018-10-27 DIAGNOSIS — R11.0 NAUSEA: Primary | ICD-10-CM

## 2018-10-27 LAB
ALBUMIN SERPL-MCNC: 4.82 G/DL (ref 3.2–4.8)
ALBUMIN/GLOB SERPL: 2.3 G/DL (ref 1.5–2.5)
ALP SERPL-CCNC: 63 U/L (ref 25–100)
ALT SERPL W P-5'-P-CCNC: 22 U/L (ref 7–40)
ANION GAP SERPL CALCULATED.3IONS-SCNC: 8 MMOL/L (ref 3–11)
AST SERPL-CCNC: 22 U/L (ref 0–33)
BACTERIA UR QL AUTO: ABNORMAL /HPF
BASOPHILS # BLD AUTO: 0.02 10*3/MM3 (ref 0–0.2)
BASOPHILS NFR BLD AUTO: 0.2 % (ref 0–1)
BILIRUB SERPL-MCNC: 0.3 MG/DL (ref 0.3–1.2)
BILIRUB UR QL STRIP: NEGATIVE
BUN BLD-MCNC: 14 MG/DL (ref 9–23)
BUN/CREAT SERPL: 16.5 (ref 7–25)
CALCIUM SPEC-SCNC: 9.6 MG/DL (ref 8.7–10.4)
CHLORIDE SERPL-SCNC: 101 MMOL/L (ref 99–109)
CLARITY UR: CLEAR
CO2 SERPL-SCNC: 29 MMOL/L (ref 20–31)
COLOR UR: YELLOW
CREAT BLD-MCNC: 0.85 MG/DL (ref 0.6–1.3)
DEPRECATED RDW RBC AUTO: 44.9 FL (ref 37–54)
EOSINOPHIL # BLD AUTO: 0.09 10*3/MM3 (ref 0–0.3)
EOSINOPHIL NFR BLD AUTO: 1 % (ref 0–3)
ERYTHROCYTE [DISTWIDTH] IN BLOOD BY AUTOMATED COUNT: 12.7 % (ref 11.3–14.5)
GFR SERPL CREATININE-BSD FRML MDRD: 68 ML/MIN/1.73
GLOBULIN UR ELPH-MCNC: 2.1 GM/DL
GLUCOSE BLD-MCNC: 108 MG/DL (ref 70–100)
GLUCOSE UR STRIP-MCNC: NEGATIVE MG/DL
HCT VFR BLD AUTO: 40.5 % (ref 34.5–44)
HGB BLD-MCNC: 13 G/DL (ref 11.5–15.5)
HGB UR QL STRIP.AUTO: ABNORMAL
HOLD SPECIMEN: NORMAL
HOLD SPECIMEN: NORMAL
HYALINE CASTS UR QL AUTO: ABNORMAL /LPF
IMM GRANULOCYTES # BLD: 0.02 10*3/MM3 (ref 0–0.03)
IMM GRANULOCYTES NFR BLD: 0.2 % (ref 0–0.6)
KETONES UR QL STRIP: ABNORMAL
LEUKOCYTE ESTERASE UR QL STRIP.AUTO: ABNORMAL
LIPASE SERPL-CCNC: 24 U/L (ref 6–51)
LYMPHOCYTES # BLD AUTO: 2.22 10*3/MM3 (ref 0.6–4.8)
LYMPHOCYTES NFR BLD AUTO: 25.9 % (ref 24–44)
MCH RBC QN AUTO: 31.2 PG (ref 27–31)
MCHC RBC AUTO-ENTMCNC: 32.1 G/DL (ref 32–36)
MCV RBC AUTO: 97.1 FL (ref 80–99)
MONOCYTES # BLD AUTO: 0.77 10*3/MM3 (ref 0–1)
MONOCYTES NFR BLD AUTO: 9 % (ref 0–12)
NEUTROPHILS # BLD AUTO: 5.46 10*3/MM3 (ref 1.5–8.3)
NEUTROPHILS NFR BLD AUTO: 63.7 % (ref 41–71)
NITRITE UR QL STRIP: NEGATIVE
PH UR STRIP.AUTO: 5.5 [PH] (ref 5–8)
PLATELET # BLD AUTO: 295 10*3/MM3 (ref 150–450)
PMV BLD AUTO: 9.3 FL (ref 6–12)
POTASSIUM BLD-SCNC: 3.8 MMOL/L (ref 3.5–5.5)
PROT SERPL-MCNC: 6.9 G/DL (ref 5.7–8.2)
PROT UR QL STRIP: NEGATIVE
RBC # BLD AUTO: 4.17 10*6/MM3 (ref 3.89–5.14)
RBC # UR: ABNORMAL /HPF
REF LAB TEST METHOD: ABNORMAL
SODIUM BLD-SCNC: 138 MMOL/L (ref 132–146)
SP GR UR STRIP: 1.02 (ref 1–1.03)
SQUAMOUS #/AREA URNS HPF: ABNORMAL /HPF
UROBILINOGEN UR QL STRIP: ABNORMAL
WBC NRBC COR # BLD: 8.58 10*3/MM3 (ref 3.5–10.8)
WBC UR QL AUTO: ABNORMAL /HPF
WHOLE BLOOD HOLD SPECIMEN: NORMAL
WHOLE BLOOD HOLD SPECIMEN: NORMAL

## 2018-10-27 PROCEDURE — 81001 URINALYSIS AUTO W/SCOPE: CPT | Performed by: EMERGENCY MEDICINE

## 2018-10-27 PROCEDURE — 80053 COMPREHEN METABOLIC PANEL: CPT

## 2018-10-27 PROCEDURE — 85025 COMPLETE CBC W/AUTO DIFF WBC: CPT

## 2018-10-27 PROCEDURE — 83690 ASSAY OF LIPASE: CPT

## 2018-10-27 PROCEDURE — 96360 HYDRATION IV INFUSION INIT: CPT

## 2018-10-27 PROCEDURE — 99283 EMERGENCY DEPT VISIT LOW MDM: CPT

## 2018-10-27 RX ORDER — SODIUM CHLORIDE 0.9 % (FLUSH) 0.9 %
10 SYRINGE (ML) INJECTION AS NEEDED
Status: DISCONTINUED | OUTPATIENT
Start: 2018-10-27 | End: 2018-10-28 | Stop reason: HOSPADM

## 2018-10-27 RX ORDER — OXYCODONE AND ACETAMINOPHEN 10; 325 MG/1; MG/1
1 TABLET ORAL ONCE
Status: COMPLETED | OUTPATIENT
Start: 2018-10-27 | End: 2018-10-27

## 2018-10-27 RX ADMIN — SODIUM CHLORIDE 1000 ML: 9 INJECTION, SOLUTION INTRAVENOUS at 20:59

## 2018-10-27 RX ADMIN — OXYCODONE HYDROCHLORIDE AND ACETAMINOPHEN 1 TABLET: 10; 325 TABLET ORAL at 21:20

## 2019-02-24 ENCOUNTER — APPOINTMENT (OUTPATIENT)
Dept: GENERAL RADIOLOGY | Facility: HOSPITAL | Age: 59
End: 2019-02-24

## 2019-02-24 ENCOUNTER — HOSPITAL ENCOUNTER (EMERGENCY)
Facility: HOSPITAL | Age: 59
Discharge: HOME | End: 2019-02-24
Attending: EMERGENCY MEDICINE | Admitting: EMERGENCY MEDICINE

## 2019-02-24 VITALS
BODY MASS INDEX: 21 KG/M2 | HEIGHT: 64 IN | HEART RATE: 113 BPM | DIASTOLIC BLOOD PRESSURE: 75 MMHG | OXYGEN SATURATION: 94 % | SYSTOLIC BLOOD PRESSURE: 118 MMHG | RESPIRATION RATE: 18 BRPM | WEIGHT: 123 LBS | TEMPERATURE: 98.9 F

## 2019-02-24 DIAGNOSIS — M79.7 FIBROMYALGIA: ICD-10-CM

## 2019-02-24 DIAGNOSIS — N39.0 ACUTE UTI: Primary | ICD-10-CM

## 2019-02-24 DIAGNOSIS — R11.2 NON-INTRACTABLE VOMITING WITH NAUSEA, UNSPECIFIED VOMITING TYPE: ICD-10-CM

## 2019-02-24 DIAGNOSIS — K31.84 GASTROPARESIS: ICD-10-CM

## 2019-02-24 LAB
ALBUMIN SERPL-MCNC: 4.42 G/DL (ref 3.2–4.8)
ALBUMIN/GLOB SERPL: 1.9 G/DL (ref 1.5–2.5)
ALP SERPL-CCNC: 61 U/L (ref 25–100)
ALT SERPL W P-5'-P-CCNC: 11 U/L (ref 7–40)
ANION GAP SERPL CALCULATED.3IONS-SCNC: 13 MMOL/L (ref 3–11)
AST SERPL-CCNC: 16 U/L (ref 0–33)
BACTERIA UR QL AUTO: ABNORMAL /HPF
BASOPHILS # BLD AUTO: 0.02 10*3/MM3 (ref 0–0.2)
BASOPHILS NFR BLD AUTO: 0.3 % (ref 0–1)
BILIRUB SERPL-MCNC: 0.3 MG/DL (ref 0.3–1.2)
BILIRUB UR QL STRIP: ABNORMAL
BUN BLD-MCNC: 14 MG/DL (ref 9–23)
BUN/CREAT SERPL: 19.2 (ref 7–25)
CALCIUM SPEC-SCNC: 9.6 MG/DL (ref 8.7–10.4)
CHLORIDE SERPL-SCNC: 104 MMOL/L (ref 99–109)
CLARITY UR: ABNORMAL
CO2 SERPL-SCNC: 24 MMOL/L (ref 20–31)
COLOR UR: ABNORMAL
CREAT BLD-MCNC: 0.73 MG/DL (ref 0.6–1.3)
DEPRECATED RDW RBC AUTO: 44.6 FL (ref 37–54)
EOSINOPHIL # BLD AUTO: 0.08 10*3/MM3 (ref 0–0.3)
EOSINOPHIL NFR BLD AUTO: 1 % (ref 0–3)
ERYTHROCYTE [DISTWIDTH] IN BLOOD BY AUTOMATED COUNT: 12.4 % (ref 11.3–14.5)
GFR SERPL CREATININE-BSD FRML MDRD: 81 ML/MIN/1.73
GLOBULIN UR ELPH-MCNC: 2.4 GM/DL
GLUCOSE BLD-MCNC: 101 MG/DL (ref 70–100)
GLUCOSE UR STRIP-MCNC: NEGATIVE MG/DL
HCT VFR BLD AUTO: 40.8 % (ref 34.5–44)
HGB BLD-MCNC: 13 G/DL (ref 11.5–15.5)
HGB UR QL STRIP.AUTO: ABNORMAL
HOLD SPECIMEN: NORMAL
HOLD SPECIMEN: NORMAL
HYALINE CASTS UR QL AUTO: ABNORMAL /LPF
IMM GRANULOCYTES # BLD AUTO: 0.01 10*3/MM3 (ref 0–0.05)
IMM GRANULOCYTES NFR BLD AUTO: 0.1 % (ref 0–0.6)
KETONES UR QL STRIP: ABNORMAL
LEUKOCYTE ESTERASE UR QL STRIP.AUTO: ABNORMAL
LIPASE SERPL-CCNC: 25 U/L (ref 6–51)
LYMPHOCYTES # BLD AUTO: 2.47 10*3/MM3 (ref 0.6–4.8)
LYMPHOCYTES NFR BLD AUTO: 32.3 % (ref 24–44)
MCH RBC QN AUTO: 31.2 PG (ref 27–31)
MCHC RBC AUTO-ENTMCNC: 31.9 G/DL (ref 32–36)
MCV RBC AUTO: 97.8 FL (ref 80–99)
MONOCYTES # BLD AUTO: 0.56 10*3/MM3 (ref 0–1)
MONOCYTES NFR BLD AUTO: 7.3 % (ref 0–12)
NEUTROPHILS # BLD AUTO: 4.52 10*3/MM3 (ref 1.5–8.3)
NEUTROPHILS NFR BLD AUTO: 59.1 % (ref 41–71)
NITRITE UR QL STRIP: POSITIVE
PH UR STRIP.AUTO: 5.5 [PH] (ref 5–8)
PLATELET # BLD AUTO: 393 10*3/MM3 (ref 150–450)
PMV BLD AUTO: 9.4 FL (ref 6–12)
POTASSIUM BLD-SCNC: 3.6 MMOL/L (ref 3.5–5.5)
PROT SERPL-MCNC: 6.8 G/DL (ref 5.7–8.2)
PROT UR QL STRIP: ABNORMAL
RBC # BLD AUTO: 4.17 10*6/MM3 (ref 3.89–5.14)
RBC # UR: ABNORMAL /HPF
REF LAB TEST METHOD: ABNORMAL
SODIUM BLD-SCNC: 141 MMOL/L (ref 132–146)
SP GR UR STRIP: 1.04 (ref 1–1.03)
SQUAMOUS #/AREA URNS HPF: ABNORMAL /HPF
UROBILINOGEN UR QL STRIP: ABNORMAL
WBC NRBC COR # BLD: 7.65 10*3/MM3 (ref 3.5–10.8)
WBC UR QL AUTO: ABNORMAL /HPF
WHOLE BLOOD HOLD SPECIMEN: NORMAL
WHOLE BLOOD HOLD SPECIMEN: NORMAL

## 2019-02-24 PROCEDURE — 96365 THER/PROPH/DIAG IV INF INIT: CPT

## 2019-02-24 PROCEDURE — 25010000002 CEFTRIAXONE PER 250 MG: Performed by: EMERGENCY MEDICINE

## 2019-02-24 PROCEDURE — 99283 EMERGENCY DEPT VISIT LOW MDM: CPT

## 2019-02-24 PROCEDURE — 83690 ASSAY OF LIPASE: CPT | Performed by: EMERGENCY MEDICINE

## 2019-02-24 PROCEDURE — 85025 COMPLETE CBC W/AUTO DIFF WBC: CPT | Performed by: EMERGENCY MEDICINE

## 2019-02-24 PROCEDURE — 80053 COMPREHEN METABOLIC PANEL: CPT | Performed by: EMERGENCY MEDICINE

## 2019-02-24 PROCEDURE — 81001 URINALYSIS AUTO W/SCOPE: CPT | Performed by: EMERGENCY MEDICINE

## 2019-02-24 PROCEDURE — 74022 RADEX COMPL AQT ABD SERIES: CPT

## 2019-02-24 PROCEDURE — 25010000002 ONDANSETRON PER 1 MG: Performed by: EMERGENCY MEDICINE

## 2019-02-24 PROCEDURE — 99284 EMERGENCY DEPT VISIT MOD MDM: CPT

## 2019-02-24 PROCEDURE — 96375 TX/PRO/DX INJ NEW DRUG ADDON: CPT

## 2019-02-24 PROCEDURE — 96361 HYDRATE IV INFUSION ADD-ON: CPT

## 2019-02-24 RX ORDER — SODIUM CHLORIDE 0.9 % (FLUSH) 0.9 %
10 SYRINGE (ML) INJECTION AS NEEDED
Status: DISCONTINUED | OUTPATIENT
Start: 2019-02-24 | End: 2019-02-24 | Stop reason: HOSPADM

## 2019-02-24 RX ORDER — GABAPENTIN 300 MG/1
600 CAPSULE ORAL ONCE
Status: COMPLETED | OUTPATIENT
Start: 2019-02-24 | End: 2019-02-24

## 2019-02-24 RX ORDER — CEFTRIAXONE SODIUM 1 G/50ML
1 INJECTION, SOLUTION INTRAVENOUS ONCE
Status: COMPLETED | OUTPATIENT
Start: 2019-02-24 | End: 2019-02-24

## 2019-02-24 RX ORDER — ONDANSETRON 2 MG/ML
8 INJECTION INTRAMUSCULAR; INTRAVENOUS ONCE
Status: COMPLETED | OUTPATIENT
Start: 2019-02-24 | End: 2019-02-24

## 2019-02-24 RX ORDER — GABAPENTIN 600 MG/1
600 TABLET ORAL 3 TIMES DAILY
COMMUNITY

## 2019-02-24 RX ORDER — CEFDINIR 300 MG/1
300 CAPSULE ORAL 2 TIMES DAILY
Qty: 14 CAPSULE | Refills: 0 | Status: SHIPPED | OUTPATIENT
Start: 2019-02-24 | End: 2019-03-03

## 2019-02-24 RX ADMIN — GABAPENTIN 600 MG: 300 CAPSULE ORAL at 19:23

## 2019-02-24 RX ADMIN — ONDANSETRON 8 MG: 2 INJECTION INTRAMUSCULAR; INTRAVENOUS at 18:50

## 2019-02-24 RX ADMIN — CEFTRIAXONE SODIUM 1 G: 1 INJECTION, SOLUTION INTRAVENOUS at 19:13

## 2019-02-24 RX ADMIN — SODIUM CHLORIDE 1000 ML: 9 INJECTION, SOLUTION INTRAVENOUS at 18:51

## 2019-02-25 ENCOUNTER — TELEPHONE (OUTPATIENT)
Dept: EMERGENCY DEPT | Facility: HOSPITAL | Age: 59
End: 2019-02-25

## 2019-05-01 ENCOUNTER — OFFICE VISIT (OUTPATIENT)
Dept: CARDIOLOGY | Facility: CLINIC | Age: 59
End: 2019-05-01

## 2019-05-01 VITALS
HEIGHT: 64 IN | DIASTOLIC BLOOD PRESSURE: 66 MMHG | BODY MASS INDEX: 21.34 KG/M2 | SYSTOLIC BLOOD PRESSURE: 102 MMHG | HEART RATE: 94 BPM | WEIGHT: 125 LBS

## 2019-05-01 DIAGNOSIS — R60.0 BILATERAL LEG EDEMA: Primary | ICD-10-CM

## 2019-05-01 PROCEDURE — 99204 OFFICE O/P NEW MOD 45 MIN: CPT | Performed by: INTERNAL MEDICINE

## 2019-05-01 PROCEDURE — 93000 ELECTROCARDIOGRAM COMPLETE: CPT | Performed by: INTERNAL MEDICINE

## 2019-05-01 RX ORDER — PROMETHAZINE HYDROCHLORIDE 25 MG/1
1 TABLET ORAL EVERY 6 HOURS PRN
COMMUNITY
Start: 1996-09-01

## 2019-10-30 ENCOUNTER — TELEPHONE (OUTPATIENT)
Dept: CARDIOLOGY | Facility: CLINIC | Age: 59
End: 2019-10-30

## 2019-10-30 NOTE — TELEPHONE ENCOUNTER
"Lm on vm to call me back- she left a msg requesting a return call- states that she is having severe BLE pain- she has had to start taking \"5 pain pills per day\"-     No mention of edema- I left a msg for her to call me back  "

## 2020-02-10 ENCOUNTER — HOSPITAL ENCOUNTER (INPATIENT)
Facility: HOSPITAL | Age: 60
LOS: 2 days | Discharge: NURSING, REHAB OR LONG-TERM CARE | End: 2020-02-12
Attending: PSYCHIATRY & NEUROLOGY | Admitting: PSYCHIATRY & NEUROLOGY

## 2020-02-10 ENCOUNTER — HOSPITAL ENCOUNTER (EMERGENCY)
Facility: HOSPITAL | Age: 60
Discharge: PSYCHIATRIC OR SUBSTANCE USE FACILITY | End: 2020-02-10
Attending: EMERGENCY MEDICINE | Admitting: EMERGENCY MEDICINE

## 2020-02-10 VITALS
RESPIRATION RATE: 18 BRPM | OXYGEN SATURATION: 95 % | DIASTOLIC BLOOD PRESSURE: 88 MMHG | HEART RATE: 105 BPM | HEIGHT: 64 IN | SYSTOLIC BLOOD PRESSURE: 142 MMHG | BODY MASS INDEX: 21.85 KG/M2 | TEMPERATURE: 98.2 F | WEIGHT: 128 LBS

## 2020-02-10 DIAGNOSIS — F29 PSYCHOSIS, UNSPECIFIED PSYCHOSIS TYPE (HCC): Primary | ICD-10-CM

## 2020-02-10 PROBLEM — F32.9 MDD (MAJOR DEPRESSIVE DISORDER): Status: ACTIVE | Noted: 2020-02-10

## 2020-02-10 LAB
6-ACETYL MORPHINE: NEGATIVE
ALBUMIN SERPL-MCNC: 4.49 G/DL (ref 3.5–5.2)
ALBUMIN/GLOB SERPL: 1.5 G/DL
ALP SERPL-CCNC: 52 U/L (ref 39–117)
ALT SERPL W P-5'-P-CCNC: 21 U/L (ref 1–33)
AMPHET+METHAMPHET UR QL: NEGATIVE
ANION GAP SERPL CALCULATED.3IONS-SCNC: 14.7 MMOL/L (ref 5–15)
AST SERPL-CCNC: 21 U/L (ref 1–32)
B-HCG UR QL: NEGATIVE
BACTERIA UR QL AUTO: ABNORMAL /HPF
BARBITURATES UR QL SCN: NEGATIVE
BASOPHILS # BLD AUTO: 0.04 10*3/MM3 (ref 0–0.2)
BASOPHILS NFR BLD AUTO: 0.5 % (ref 0–1.5)
BENZODIAZ UR QL SCN: POSITIVE
BILIRUB SERPL-MCNC: 0.3 MG/DL (ref 0.2–1.2)
BILIRUB UR QL STRIP: NEGATIVE
BUN BLD-MCNC: 9 MG/DL (ref 8–23)
BUN/CREAT SERPL: 14.5 (ref 7–25)
BUPRENORPHINE SERPL-MCNC: NEGATIVE NG/ML
CALCIUM SPEC-SCNC: 9.4 MG/DL (ref 8.6–10.5)
CANNABINOIDS SERPL QL: NEGATIVE
CHLORIDE SERPL-SCNC: 102 MMOL/L (ref 98–107)
CLARITY UR: ABNORMAL
CO2 SERPL-SCNC: 26.3 MMOL/L (ref 22–29)
COCAINE UR QL: NEGATIVE
COLOR UR: YELLOW
CREAT BLD-MCNC: 0.62 MG/DL (ref 0.57–1)
DEPRECATED RDW RBC AUTO: 41.7 FL (ref 37–54)
EOSINOPHIL # BLD AUTO: 0.08 10*3/MM3 (ref 0–0.4)
EOSINOPHIL NFR BLD AUTO: 1 % (ref 0.3–6.2)
ERYTHROCYTE [DISTWIDTH] IN BLOOD BY AUTOMATED COUNT: 12 % (ref 12.3–15.4)
ETHANOL BLD-MCNC: <10 MG/DL (ref 0–10)
ETHANOL UR QL: <0.01 %
GFR SERPL CREATININE-BSD FRML MDRD: 98 ML/MIN/1.73
GLOBULIN UR ELPH-MCNC: 3 GM/DL
GLUCOSE BLD-MCNC: 101 MG/DL (ref 65–99)
GLUCOSE UR STRIP-MCNC: NEGATIVE MG/DL
HCT VFR BLD AUTO: 35.9 % (ref 34–46.6)
HGB BLD-MCNC: 11.4 G/DL (ref 12–15.9)
HGB UR QL STRIP.AUTO: ABNORMAL
HYALINE CASTS UR QL AUTO: ABNORMAL /LPF
IMM GRANULOCYTES # BLD AUTO: 0.03 10*3/MM3 (ref 0–0.05)
IMM GRANULOCYTES NFR BLD AUTO: 0.4 % (ref 0–0.5)
KETONES UR QL STRIP: ABNORMAL
LEUKOCYTE ESTERASE UR QL STRIP.AUTO: ABNORMAL
LYMPHOCYTES # BLD AUTO: 2.58 10*3/MM3 (ref 0.7–3.1)
LYMPHOCYTES NFR BLD AUTO: 31 % (ref 19.6–45.3)
MAGNESIUM SERPL-MCNC: 1.9 MG/DL (ref 1.6–2.4)
MCH RBC QN AUTO: 30.5 PG (ref 26.6–33)
MCHC RBC AUTO-ENTMCNC: 31.8 G/DL (ref 31.5–35.7)
MCV RBC AUTO: 96 FL (ref 79–97)
METHADONE UR QL SCN: NEGATIVE
MONOCYTES # BLD AUTO: 0.75 10*3/MM3 (ref 0.1–0.9)
MONOCYTES NFR BLD AUTO: 9 % (ref 5–12)
MUCOUS THREADS URNS QL MICRO: ABNORMAL /HPF
NEUTROPHILS # BLD AUTO: 4.85 10*3/MM3 (ref 1.7–7)
NEUTROPHILS NFR BLD AUTO: 58.1 % (ref 42.7–76)
NITRITE UR QL STRIP: NEGATIVE
NRBC BLD AUTO-RTO: 0 /100 WBC (ref 0–0.2)
OPIATES UR QL: POSITIVE
OXYCODONE UR QL SCN: POSITIVE
PCP UR QL SCN: NEGATIVE
PH UR STRIP.AUTO: 6.5 [PH] (ref 5–8)
PLATELET # BLD AUTO: 343 10*3/MM3 (ref 140–450)
PMV BLD AUTO: 9.1 FL (ref 6–12)
POTASSIUM BLD-SCNC: 3.2 MMOL/L (ref 3.5–5.2)
PROT SERPL-MCNC: 7.5 G/DL (ref 6–8.5)
PROT UR QL STRIP: ABNORMAL
RBC # BLD AUTO: 3.74 10*6/MM3 (ref 3.77–5.28)
RBC # UR: ABNORMAL /HPF
REF LAB TEST METHOD: ABNORMAL
SODIUM BLD-SCNC: 143 MMOL/L (ref 136–145)
SP GR UR STRIP: >=1.03 (ref 1–1.03)
SQUAMOUS #/AREA URNS HPF: ABNORMAL /HPF
UROBILINOGEN UR QL STRIP: ABNORMAL
WBC NRBC COR # BLD: 8.33 10*3/MM3 (ref 3.4–10.8)
WBC UR QL AUTO: ABNORMAL /HPF
YEAST URNS QL MICRO: ABNORMAL /HPF

## 2020-02-10 PROCEDURE — 80307 DRUG TEST PRSMV CHEM ANLYZR: CPT | Performed by: EMERGENCY MEDICINE

## 2020-02-10 PROCEDURE — 85025 COMPLETE CBC W/AUTO DIFF WBC: CPT | Performed by: EMERGENCY MEDICINE

## 2020-02-10 PROCEDURE — 36415 COLL VENOUS BLD VENIPUNCTURE: CPT

## 2020-02-10 PROCEDURE — 81025 URINE PREGNANCY TEST: CPT | Performed by: EMERGENCY MEDICINE

## 2020-02-10 PROCEDURE — 99285 EMERGENCY DEPT VISIT HI MDM: CPT

## 2020-02-10 PROCEDURE — 80053 COMPREHEN METABOLIC PANEL: CPT | Performed by: EMERGENCY MEDICINE

## 2020-02-10 PROCEDURE — 83735 ASSAY OF MAGNESIUM: CPT | Performed by: EMERGENCY MEDICINE

## 2020-02-10 PROCEDURE — 81001 URINALYSIS AUTO W/SCOPE: CPT | Performed by: EMERGENCY MEDICINE

## 2020-02-10 RX ORDER — POTASSIUM CHLORIDE 1.5 G/1.77G
40 POWDER, FOR SOLUTION ORAL AS NEEDED
Status: DISCONTINUED | OUTPATIENT
Start: 2020-02-10 | End: 2020-02-12 | Stop reason: HOSPADM

## 2020-02-10 RX ORDER — BENZTROPINE MESYLATE 1 MG/1
2 TABLET ORAL ONCE AS NEEDED
Status: DISCONTINUED | OUTPATIENT
Start: 2020-02-10 | End: 2020-02-12 | Stop reason: HOSPADM

## 2020-02-10 RX ORDER — LORAZEPAM 0.5 MG/1
0.5 TABLET ORAL EVERY 4 HOURS PRN
Status: DISCONTINUED | OUTPATIENT
Start: 2020-02-14 | End: 2020-02-11

## 2020-02-10 RX ORDER — DICYCLOMINE HYDROCHLORIDE 10 MG/1
10 CAPSULE ORAL 3 TIMES DAILY PRN
Status: DISCONTINUED | OUTPATIENT
Start: 2020-02-10 | End: 2020-02-12 | Stop reason: HOSPADM

## 2020-02-10 RX ORDER — LORAZEPAM 0.5 MG/1
0.5 TABLET ORAL
Status: DISCONTINUED | OUTPATIENT
Start: 2020-02-14 | End: 2020-02-11

## 2020-02-10 RX ORDER — POTASSIUM CHLORIDE 20 MEQ/1
40 TABLET, EXTENDED RELEASE ORAL ONCE
Status: DISCONTINUED | OUTPATIENT
Start: 2020-02-11 | End: 2020-02-12 | Stop reason: HOSPADM

## 2020-02-10 RX ORDER — LORAZEPAM 2 MG/1
2 TABLET ORAL
Status: DISCONTINUED | OUTPATIENT
Start: 2020-02-11 | End: 2020-02-11

## 2020-02-10 RX ORDER — ALUMINA, MAGNESIA, AND SIMETHICONE 2400; 2400; 240 MG/30ML; MG/30ML; MG/30ML
15 SUSPENSION ORAL EVERY 6 HOURS PRN
Status: DISCONTINUED | OUTPATIENT
Start: 2020-02-10 | End: 2020-02-12 | Stop reason: HOSPADM

## 2020-02-10 RX ORDER — FLUCONAZOLE 100 MG/1
200 TABLET ORAL ONCE
Status: COMPLETED | OUTPATIENT
Start: 2020-02-10 | End: 2020-02-10

## 2020-02-10 RX ORDER — ACETAMINOPHEN 325 MG/1
650 TABLET ORAL EVERY 6 HOURS PRN
Status: DISCONTINUED | OUTPATIENT
Start: 2020-02-10 | End: 2020-02-12 | Stop reason: HOSPADM

## 2020-02-10 RX ORDER — POTASSIUM CHLORIDE 20 MEQ/1
40 TABLET, EXTENDED RELEASE ORAL ONCE
Status: COMPLETED | OUTPATIENT
Start: 2020-02-10 | End: 2020-02-10

## 2020-02-10 RX ORDER — LORAZEPAM 2 MG/1
2 TABLET ORAL
Status: ACTIVE | OUTPATIENT
Start: 2020-02-10 | End: 2020-02-11

## 2020-02-10 RX ORDER — BENZONATATE 100 MG/1
100 CAPSULE ORAL 3 TIMES DAILY PRN
Status: DISCONTINUED | OUTPATIENT
Start: 2020-02-10 | End: 2020-02-12 | Stop reason: HOSPADM

## 2020-02-10 RX ORDER — HYDROXYZINE 50 MG/1
50 TABLET, FILM COATED ORAL EVERY 6 HOURS PRN
Status: DISCONTINUED | OUTPATIENT
Start: 2020-02-10 | End: 2020-02-12 | Stop reason: HOSPADM

## 2020-02-10 RX ORDER — IBUPROFEN 400 MG/1
400 TABLET ORAL EVERY 6 HOURS PRN
Status: DISCONTINUED | OUTPATIENT
Start: 2020-02-10 | End: 2020-02-12 | Stop reason: HOSPADM

## 2020-02-10 RX ORDER — CLONIDINE HYDROCHLORIDE 0.1 MG/1
0.1 TABLET ORAL 4 TIMES DAILY PRN
Status: DISCONTINUED | OUTPATIENT
Start: 2020-02-11 | End: 2020-02-11

## 2020-02-10 RX ORDER — BENZTROPINE MESYLATE 1 MG/ML
1 INJECTION INTRAMUSCULAR; INTRAVENOUS ONCE AS NEEDED
Status: DISCONTINUED | OUTPATIENT
Start: 2020-02-10 | End: 2020-02-12 | Stop reason: HOSPADM

## 2020-02-10 RX ORDER — LORAZEPAM 1 MG/1
1 TABLET ORAL EVERY 4 HOURS PRN
Status: DISCONTINUED | OUTPATIENT
Start: 2020-02-13 | End: 2020-02-11

## 2020-02-10 RX ORDER — CLONIDINE HYDROCHLORIDE 0.1 MG/1
0.1 TABLET ORAL 2 TIMES DAILY PRN
Status: DISCONTINUED | OUTPATIENT
Start: 2020-02-13 | End: 2020-02-11

## 2020-02-10 RX ORDER — CLONIDINE HYDROCHLORIDE 0.1 MG/1
0.1 TABLET ORAL DAILY PRN
Status: DISCONTINUED | OUTPATIENT
Start: 2020-02-14 | End: 2020-02-11

## 2020-02-10 RX ORDER — LORAZEPAM 1 MG/1
1 TABLET ORAL
Status: DISCONTINUED | OUTPATIENT
Start: 2020-02-13 | End: 2020-02-11

## 2020-02-10 RX ORDER — POTASSIUM CHLORIDE 20 MEQ/1
40 TABLET, EXTENDED RELEASE ORAL AS NEEDED
Status: DISCONTINUED | OUTPATIENT
Start: 2020-02-10 | End: 2020-02-12 | Stop reason: HOSPADM

## 2020-02-10 RX ORDER — CLONIDINE HYDROCHLORIDE 0.1 MG/1
0.1 TABLET ORAL 3 TIMES DAILY PRN
Status: DISCONTINUED | OUTPATIENT
Start: 2020-02-12 | End: 2020-02-11

## 2020-02-10 RX ORDER — CYCLOBENZAPRINE HCL 10 MG
10 TABLET ORAL 3 TIMES DAILY PRN
Status: DISCONTINUED | OUTPATIENT
Start: 2020-02-10 | End: 2020-02-12 | Stop reason: HOSPADM

## 2020-02-10 RX ORDER — TRAZODONE HYDROCHLORIDE 50 MG/1
50 TABLET ORAL NIGHTLY PRN
Status: DISCONTINUED | OUTPATIENT
Start: 2020-02-10 | End: 2020-02-12 | Stop reason: HOSPADM

## 2020-02-10 RX ORDER — LOPERAMIDE HYDROCHLORIDE 2 MG/1
2 CAPSULE ORAL
Status: DISCONTINUED | OUTPATIENT
Start: 2020-02-10 | End: 2020-02-12 | Stop reason: HOSPADM

## 2020-02-10 RX ORDER — FAMOTIDINE 20 MG/1
20 TABLET, FILM COATED ORAL 2 TIMES DAILY PRN
Status: DISCONTINUED | OUTPATIENT
Start: 2020-02-10 | End: 2020-02-12 | Stop reason: HOSPADM

## 2020-02-10 RX ORDER — CLONIDINE HYDROCHLORIDE 0.1 MG/1
0.1 TABLET ORAL 4 TIMES DAILY PRN
Status: DISCONTINUED | OUTPATIENT
Start: 2020-02-10 | End: 2020-02-11

## 2020-02-10 RX ORDER — LORAZEPAM 2 MG/1
2 TABLET ORAL EVERY 4 HOURS PRN
Status: DISCONTINUED | OUTPATIENT
Start: 2020-02-11 | End: 2020-02-11

## 2020-02-10 RX ORDER — ECHINACEA PURPUREA EXTRACT 125 MG
2 TABLET ORAL AS NEEDED
Status: DISCONTINUED | OUTPATIENT
Start: 2020-02-10 | End: 2020-02-12 | Stop reason: HOSPADM

## 2020-02-10 RX ORDER — ONDANSETRON 4 MG/1
4 TABLET, FILM COATED ORAL EVERY 6 HOURS PRN
Status: DISCONTINUED | OUTPATIENT
Start: 2020-02-10 | End: 2020-02-11

## 2020-02-10 RX ADMIN — FLUCONAZOLE 200 MG: 100 TABLET ORAL at 21:41

## 2020-02-10 RX ADMIN — POTASSIUM CHLORIDE 40 MEQ: 1500 TABLET, EXTENDED RELEASE ORAL at 21:41

## 2020-02-11 PROBLEM — F33.8 OTHER RECURRENT DEPRESSIVE DISORDERS (HCC): Status: ACTIVE | Noted: 2020-02-11

## 2020-02-11 PROBLEM — F60.89 CLUSTER B PERSONALITY DISORDER (HCC): Status: ACTIVE | Noted: 2020-02-11

## 2020-02-11 PROCEDURE — 93005 ELECTROCARDIOGRAM TRACING: CPT | Performed by: PSYCHIATRY & NEUROLOGY

## 2020-02-11 PROCEDURE — 93010 ELECTROCARDIOGRAM REPORT: CPT | Performed by: INTERNAL MEDICINE

## 2020-02-11 PROCEDURE — 99223 1ST HOSP IP/OBS HIGH 75: CPT | Performed by: PSYCHIATRY & NEUROLOGY

## 2020-02-11 RX ORDER — CLONAZEPAM 1 MG/1
1 TABLET ORAL 3 TIMES DAILY
Status: DISCONTINUED | OUTPATIENT
Start: 2020-02-11 | End: 2020-02-12 | Stop reason: HOSPADM

## 2020-02-11 RX ORDER — MIRTAZAPINE 15 MG/1
15 TABLET, FILM COATED ORAL NIGHTLY
Status: DISCONTINUED | OUTPATIENT
Start: 2020-02-11 | End: 2020-02-12 | Stop reason: HOSPADM

## 2020-02-11 RX ORDER — ONDANSETRON 4 MG/1
4 TABLET, ORALLY DISINTEGRATING ORAL EVERY 8 HOURS PRN
Status: DISCONTINUED | OUTPATIENT
Start: 2020-02-11 | End: 2020-02-12 | Stop reason: HOSPADM

## 2020-02-11 RX ORDER — ONDANSETRON 4 MG/1
4 TABLET, ORALLY DISINTEGRATING ORAL EVERY 8 HOURS PRN
Status: CANCELLED | OUTPATIENT
Start: 2020-02-11

## 2020-02-11 RX ORDER — METOCLOPRAMIDE 10 MG/1
5 TABLET ORAL
Status: DISCONTINUED | OUTPATIENT
Start: 2020-02-11 | End: 2020-02-12 | Stop reason: HOSPADM

## 2020-02-11 RX ORDER — PROMETHAZINE HYDROCHLORIDE 25 MG/1
25 TABLET ORAL EVERY 6 HOURS PRN
Status: CANCELLED | OUTPATIENT
Start: 2020-02-11

## 2020-02-11 RX ORDER — CLONAZEPAM 1 MG/1
2 TABLET ORAL 3 TIMES DAILY PRN
Status: CANCELLED | OUTPATIENT
Start: 2020-02-11

## 2020-02-11 RX ORDER — GABAPENTIN 600 MG/1
600 TABLET ORAL 3 TIMES DAILY
Status: CANCELLED | OUTPATIENT
Start: 2020-02-11

## 2020-02-11 RX ORDER — OXYCODONE AND ACETAMINOPHEN 10; 325 MG/1; MG/1
1 TABLET ORAL EVERY 12 HOURS
Status: CANCELLED | OUTPATIENT
Start: 2020-02-11

## 2020-02-11 RX ORDER — OXYCODONE AND ACETAMINOPHEN 7.5; 325 MG/1; MG/1
1 TABLET ORAL 2 TIMES DAILY PRN
Status: DISCONTINUED | OUTPATIENT
Start: 2020-02-11 | End: 2020-02-12 | Stop reason: HOSPADM

## 2020-02-11 RX ORDER — GABAPENTIN 300 MG/1
300 CAPSULE ORAL 3 TIMES DAILY
Status: DISCONTINUED | OUTPATIENT
Start: 2020-02-11 | End: 2020-02-12 | Stop reason: HOSPADM

## 2020-02-11 RX ADMIN — CLONAZEPAM 1 MG: 1 TABLET ORAL at 16:51

## 2020-02-11 RX ADMIN — GABAPENTIN 300 MG: 300 CAPSULE ORAL at 21:06

## 2020-02-11 RX ADMIN — OXYCODONE HYDROCHLORIDE AND ACETAMINOPHEN 1 TABLET: 7.5; 325 TABLET ORAL at 17:30

## 2020-02-11 RX ADMIN — HYDROXYZINE HYDROCHLORIDE 50 MG: 50 TABLET ORAL at 11:17

## 2020-02-11 RX ADMIN — MIRTAZAPINE 15 MG: 15 TABLET, FILM COATED ORAL at 21:06

## 2020-02-11 RX ADMIN — CLONAZEPAM 1 MG: 1 TABLET ORAL at 21:06

## 2020-02-11 RX ADMIN — GABAPENTIN 300 MG: 300 CAPSULE ORAL at 16:51
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
## 2020-02-11 NOTE — NURSING NOTE
"Upon arrival to unit, patient verbally expressed that she was distraught with being admitted to the \"[facility].\" Staff attempted to explain the admission process to the patient. Patient became more irritable and agitated, stating, \"I was coaxed to come to the [facility]. I was asked if I wanted to or felt like harming myself and I told them no. I came to the emergency room because I need help with my medical problems. I want a copy of the papers that I signed in the emergency room.\" Staff again, attempted to explain to the patient that we have no control over what happens in the intake department and explained to her that she would be assigned a psychiatrist in the morning and she could discuss her concerns with them. Patient then ripped up the unit rules and visitation sheet that was given to her and refused to sign the personal belonging inventory sheet. Patient then started to demand all her home medications and something for nausea. Staff offered PRN zofran to the patient and patient became angry stating, \"that's not what I take at home. I need my home medications. I need my neurontin, klonopin, and oxycodone.How do you all expect me to get any sleep without my medication? And I can't sleep on a flat bed. What kind of hospital is this?\" Staff attempted to review the PRN routine unit medications with the patient. Patient refused to listen to the staff and was demanding that all the routine medications be written down. Patient refused to take any medications or to have her vital signs taken as long as she was on this unit. Patient was given a copy of the voluntary admission form that she signed in intake as she requested. Patient asked to talk to the \"patient advocate.\" Staff offered to call the Lead RN and patient refused. Patient continued to be demanding and argumentative with staff, stating, \"I am taking notes of all of this and I will be taking this to court. Watch and see!\"    "

## 2020-02-11 NOTE — DISCHARGE INSTR - APPOINTMENTS
Aubrey Mercado   [address] [place] [address] [place] [address] [phone]    February 19 2020 at 2:00pm

## 2020-02-11 NOTE — NURSING NOTE
Respiratory therapist on unit to obtain ordered ECG. Patient refused. Staff explained the importance of obtaining an ECG and patient adamantly refused.

## 2020-02-11 NOTE — NURSING NOTE
Pt arrived in intake, searched with two staff members present, pt's belongings placed in safe storage, safety precautions in place at this time.

## 2020-02-11 NOTE — ED PROVIDER NOTES
Subjective   59-year-old white female presents secondary to need for psychiatric evaluation.  Patient reports having chronic regional pain syndrome, fibromyalgia, OCD and gastroparesis.  She is being seen by multiple specialist.  She apparently had an overdose last week.  She was sent on a 72-hour hold to [facility].  She has no new medical complaints.  She states that she has been depressed and this is a very touchy subject.          Review of Systems   Constitutional: Negative.  Negative for fever.   HENT: Negative.    Respiratory: Negative.    Cardiovascular: Negative.  Negative for chest pain.   Gastrointestinal: Negative.  Negative for abdominal pain.   Endocrine: Negative.    Genitourinary: Negative.  Negative for dysuria.   Skin: Negative.    Neurological: Negative.    Psychiatric/Behavioral: Negative.    All other systems reviewed and are negative.      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 (CMS/[facility])    • Rheumatoid arthritis (CMS/[facility]) 2016   • Symptomatic PVCs 2016   • VHD (valvular heart disease) 2016       Allergies   Allergen Reactions   • Morphine And Related Itching   • Penicillins Hives       Past Surgical History:   Procedure Laterality Date   • BACK SURGERY      disc fusion   • BREAST LUMPECTOMY Bilateral    •  SECTION      x2       Family History   Problem Relation Age of Onset   • Heart attack Father    • Sudden death Father    • Heart disease Mother    • Heart disease Sister    • Heart attack Brother        Social History     Socioeconomic History   • Marital status:      Spouse name: Not on file   • Number of children: Not on file   • Years of education:  Not on file   • Highest education level: Not on file   Tobacco Use   • Smoking status: Former Smoker     Last attempt to quit: 2013     Years since quittin.1   • Smokeless tobacco: Never Used   • Tobacco comment: quit for 15 yrs restarted, quit again, does vapor   Substance and Sexual Activity   • Alcohol use: No   • Drug use: No   • Sexual activity: Defer           Objective   Physical Exam   Constitutional: She is oriented to person, place, and time. She appears well-developed and well-nourished. No distress.   HENT:   Head: Normocephalic and atraumatic.   Right Ear: External ear normal.   Left Ear: External ear normal.   Nose: Nose normal.   Eyes: Pupils are equal, round, and reactive to light. Conjunctivae and EOM are normal.   Neck: Normal range of motion. Neck supple. No JVD present. No tracheal deviation present.   Cardiovascular: Normal rate, regular rhythm and normal heart sounds.   No murmur heard.  Pulmonary/Chest: Effort normal and breath sounds normal. No respiratory distress. She has no wheezes.   Abdominal: Soft. Bowel sounds are normal. There is no tenderness.   Musculoskeletal: Normal range of motion. She exhibits no edema or deformity.   Neurological: She is alert and oriented to person, place, and time. No cranial nerve deficit.   Skin: Skin is warm and dry. No rash noted. She is not diaphoretic. No erythema. No pallor.   Psychiatric: She has a normal mood and affect. Her behavior is normal. Thought content normal.   Nursing note and vitals reviewed.      Procedures           ED Course  ED Course as of Feb 11 0240   Mon Feb 10, 2020   2008 Signed out to Lauren Morris    [JI]      ED Course User Index  [JI] Miguelito Horn PA                                               [facility]    Final diagnoses:   Psychosis, unspecified psychosis type (CMS/HCC)            Lauren Morris PA  20 0240

## 2020-02-11 NOTE — PLAN OF CARE
Problem: Patient Care Overview  Goal: Plan of Care Review  Outcome: Ongoing (interventions implemented as appropriate)  Flowsheets (Taken 2/11/2020 1342)  Consent Given to Review Plan with: Spouse Velasquez Costa  Progress: no change  Plan of Care Reviewed With: patient;spouse  Patient Agreement with Plan of Care: agrees  Outcome Summary: Therapist met with patient for individual session. Completed social history and integrated summary  Goal: Individualization and Mutuality  Outcome: Ongoing (interventions implemented as appropriate)  Flowsheets (Taken 2/11/2020 1342)  Patient Personal Strengths: expressive of emotions;expressive of needs;spiritual/[place] support;resourceful;stable living environment;positive educational history  Patient Vulnerabilities: Ineffective coping.  Patient Specific Goals (Include Timeframe): Patient will deny SI/HI prior to discharge. Patient will consent to appropriate aftercare plan prior to discharge. Patient will identify 1 healthy coping skill for depression prior to discharge.  How to Address Anxieties/Fears: Patient agreeable to talk to staff as needed  Patient Specific Interventions: Therapist will offer 1-4 individual, family education, aftercare planning.  What Information Would Help Us Give You More Personalized Care?: None reported  How Would You and/or Your Support Person Like to Participate in Your Care?: Patient agreeable to involve her spoue Nicol Costa.  What Anxieties, Fears, Concerns, or Questions Do You Have About Your Care?: Patient reports that she doesn't want to be here and needs to go to medical unit.  Patient Specific Preferences: None reported  Goal: Discharge Needs Assessment  Outcome: Ongoing (interventions implemented as appropriate)  Flowsheets (Taken 2/11/2020 1342)  Outpatient/Agency/Support Group Needs: outpatient psychiatric care (specify);outpatient counseling;outpatient medication management  Concerns Comments: No comment  Transportation Anticipated:  family or friend will provide  Transportation Concerns: car, none  Concerns to be Addressed: compliance issue;coping/stress;mental health;suicidal  Readmission Within the Last 30 Days: no previous admission in last 30 days  Patient/Family Anticipated Services at Transition: outpatient care;mental health services  Patient's Choice of Community Agency(s): Patient signed consent for [facility]  Patient/Family Anticipates Transition to: home with family  Offered/Gave Vendor List: no  Goal: Interprofessional Rounds/Family Conf  Outcome: Ongoing (interventions implemented as appropriate)  Flowsheets (Taken 2/11/2020 1348)  Participants: milieu/psych techs; patient; social work; nursing; family; psychiatrist  Summary: Treatment team staffing     Problem: Overarching Goals (Adult)  Goal: Adheres to Safety Considerations for Self and Others  Outcome: Ongoing (interventions implemented as appropriate)  Goal: Optimized Coping Skills in Response to Life Stressors  Intervention: Promote Effective Coping Strategies  Flowsheets (Taken 2/11/2020 1348)  Supportive Measures: counseling provided; active listening utilized  Goal: Develops/Participates in Therapeutic [place] to Support Successful Transition  Intervention: Foster Therapeutic [place]  Flowsheets (Taken 2/11/2020 0750 by Brittani Chávez, RN)  Trust Relationship/Rapport: care explained;choices provided;emotional support provided;empathic listening provided;questions answered;questions encouraged;reassurance provided;thoughts/feelings acknowledged (notified Dr Baker )  Intervention: Mutually Develop Transition Plan  Flowsheets (Taken 2/11/2020 1348)  Transition Support: community resources reviewed; follow-up care coordinated; crisis management plan promoted; follow-up care discussed; crisis management plan verbalized    1030:     DATA:         Therapist met individually with patient this date to introduce role and to discuss hospitalization expectations. Patient  "agreeable. Therapist present during Dr. Baker evaluation and staffed case his date.      Treatment team gained consent and spoke with patient's spouse this date.  Velasquez Costa [phone].  He states that the patient has been doing worse for the past 2 to 3 weeks.  He states her overdose was indeed intentional.  He states that last Tuesday she had called her cousin to come and lay down beside her while she .  The patient was found facedown gurgling in her own vomit and had to be revived.  This led to the patient being admitted at [facility] where she was placed on a 72-hour involuntary hold.   states she also had an overdose attempt 8 months ago as well.  Velasquez indicates that he learned about the [facility] on the internet and thought it may help the patient.  He reports that he is interested in the  IOP referral.     Therapist spoke with patient's outpatient therapist Aubrey Mercado.  He confirms that he has seen patient ongoing for 20+ years. Mostly for depression and her struggles with chronic health issues. However also reports that patient may also have psychosomatic issues.  He was agreeable to continuing counseling and we scheduled follow up on  at 2:00 p.m.    1420:  Therapist contacted patient's spouse Nicol Costa at [phone]; He reports that patient called him to the unit earlier to convince him to get her discharged.  He reports \"I a lost in tall weeds with her.\"   Therapist reviewed that patient would remain hospitalized this date and that this therapist could update him tomorrow. Reviewed recommendation of [facility], but that patient does not seem receptive. If she becomes agreeable therapist would have to check on costs as patient has private insurance.  He verbalized understanding.      Clinical Maneuvering/Intervention:     Therapist assisted patient in processing above session content; acknowledged and normalized patient’s thoughts, feelings, and concerns.  Discussed " "the therapist/patient relationship and explain the parameters and limitations of relative confidentiality.  Also discussed the importance of active participation, and honesty to the treatment process.  Encouraged the patient to discuss/vent their feelings, frustrations, and fears concerning their ongoing medical issues and validated her feelings.     Allowed patient to freely discuss issues without interruption or judgment. Provided safe, confidential environment to facilitate the development of positive therapeutic relationship and encourage open, honest communication.      Therapist addressed discharge safety planning this date. Assisted patient in identifying risk factors which would indicate the need for higher level of care after discharge;  including thoughts to harm self or others and/or self-harming behavior. Encouraged patient to call 911, or present to the nearest emergency room should any of these events occur. Discussed crisis intervention services and means to access.  Encouraged securing any objects of harm.       Therapist completed integrated summary, treatment plan, and initiated social history this date.  Therapist is strongly encouraging family involvement in treatment.       ASSESSMENT:      The patient is a 59 year old , , retired female.  The patient has a history of Masters degree in middle school education. Patient currently drawing disability. Patient resides in [facility].  Patient admitted on 2/10/2020 with complaints of presenting to the ED with suicidal ideation after just being discharged from [facility] for an overdose.  Today, patient appears to lack insight regarding reasons for psych admission.  Patient reports that she has several medical conditions including Gastroparesis and \"needs to go to medical.\"  Patient apparently made several statements of \"it would be a blessing to be dead\", and \"why cannot I just die\" in the ED which justified psychiatric " admission.  Today, patient has appared emotional on unit and focused on discharge.  Patient walks into the interview smiling and presenting as if nothing is wrong except her medical problems.  Patient minimizing any mental health issues, other than just being down over her medical problems.  Patient denies substance abuse issues and AVH.  Patient has signed consent for [facility] in [facility] and her spouse Velasquez Costa.      PLAN:       Patient to remain hospitalized this date.     Treatment team will focus efforts on stabilizing patient's acute symptoms while providing education on healthy coping and crisis management to reduce hospitalizations.   Patient requires daily psychiatrist evaluation and 24/7 nursing supervision to promote patient  safety.     Therapist will offer 1-4 individual sessions (20-30 minutes each), 1 therapy group daily, family education, and appropriate referral.    Therapist recommends outpatient psych or MH IOP referral.  Patient has private insurance and has signed consent for [facility] counseling in [facility]. Patient spouse was interested in MH IOP, however patient is not receptive this date. Will this if this is a possibility. Therapist Naomi indicates that there would likely be a copay of about $100 daily but that we would need to check on that if patient became agreeable.  Patient to return home with spouse and home is reported to be safe guarded.

## 2020-02-11 NOTE — PLAN OF CARE
Problem: Patient Care Overview  Goal: Plan of Care Review  Outcome: Ongoing (interventions implemented as appropriate)  Flowsheets (Taken 2/11/2020 8750)  Progress: no change  Plan of Care Reviewed With: patient  Patient Agreement with Plan of Care: refuses to participate  Outcome Summary: New admit; patient has refused to participate in all care; see previous nursing note for details.

## 2020-02-11 NOTE — NURSING NOTE
"Presented to ED along with her .  It was reported that she was discharged from [facility] earlier today.  It was reported that prior to admission there, she was seen at [facility] for an accidental overdose.  Per intake documentation, she reported that Hanh took her off all of her medication, and she feels like she is in w/d.  Denied SI, but per chart stated \"It would be a blessing to be dead,\" and \"why can't I just die.\"  Upon admission, pt verbalizes her displeasure with being admitted to the psychiatric unit, and refuses to participate with admission assessment.    "

## 2020-02-11 NOTE — NURSING NOTE
"Patient presents to ER with spouse. She reports she got discharged from [facility] today. According to patient and spouse she accidentally overdosed and was sent from [facility] to [place]. Patient reports that took her off of all her prescribed medication at [facility] and she feels like she is in withdraws but she can't tell because this is how she feels all the time. She reports taking 0.5mg Klonopin 4 times a day, 600mg Neurontin 3 times a day, and Oxycodone 10s 2 times a day. She reports her stressors as her bad health. She reports being miserable and having no quality of life. She denies si but states \"It would be a blessing to be dead\" and also \"why can't I just die.\"  "

## 2020-02-11 NOTE — NURSING NOTE
Presented pt to Dr. Baker. Informed of potassium 3.2. New orders to admit patient. SP3. Potassium protocol.  Ativan and clonidine detox. Repeat cmp in am. [id number]

## 2020-02-11 NOTE — NURSING NOTE
Dr. Baker requested hospitalist consult notified Dr. Fletcher. Dr Fletcher states patient needs to see a gastroenterologist or surgery to evaluate the problem and declined the consult notified  Dr. Baker of Dr. Fletcher recommendations.

## 2020-02-11 NOTE — NURSING NOTE
Patient reports wanting to be transferred to the medical floor and patient reports did not want to be admitted to the [facility] and requests to talk with supervisor notified  and Lesli lead RN.

## 2020-02-12 VITALS
DIASTOLIC BLOOD PRESSURE: 77 MMHG | OXYGEN SATURATION: 97 % | BODY MASS INDEX: 20.9 KG/M2 | HEIGHT: 64 IN | TEMPERATURE: 98.1 F | HEART RATE: 125 BPM | RESPIRATION RATE: 18 BRPM | SYSTOLIC BLOOD PRESSURE: 125 MMHG | WEIGHT: 122.4 LBS

## 2020-02-12 PROCEDURE — 99239 HOSP IP/OBS DSCHRG MGMT >30: CPT | Performed by: PSYCHIATRY & NEUROLOGY

## 2020-02-12 PROCEDURE — 63710000001 ONDANSETRON ODT 4 MG TABLET DISPERSIBLE: Performed by: PSYCHIATRY & NEUROLOGY

## 2020-02-12 RX ADMIN — ACETAMINOPHEN 650 MG: 325 TABLET ORAL at 07:31

## 2020-02-12 RX ADMIN — OXYCODONE HYDROCHLORIDE AND ACETAMINOPHEN 1 TABLET: 7.5; 325 TABLET ORAL at 08:19

## 2020-02-12 RX ADMIN — ONDANSETRON 4 MG: 4 TABLET, ORALLY DISINTEGRATING ORAL at 08:11

## 2020-02-12 RX ADMIN — CLONAZEPAM 1 MG: 1 TABLET ORAL at 08:18

## 2020-02-12 RX ADMIN — GABAPENTIN 300 MG: 300 CAPSULE ORAL at 08:18

## 2020-02-12 NOTE — PLAN OF CARE
"Patient rates anxiety 10 and depression 10 states because \"I am so sick\" denies thoughts to harm self and others, and denies hallucinations. Patient report not wanting to be here and requests transfer to the medical floor. Patient complains of having nausea, vomiting, and diarrhea states had 9 bowel movements today. Patient reports has not eaten in 8 days.   "

## 2020-02-12 NOTE — NURSING NOTE
Patient reports wanting to leave to go to medical floor and patient put in writing intent to leave notified Dr. Baker order received for 72 hour hold.

## 2020-02-12 NOTE — PLAN OF CARE
Problem: Patient Care Overview  Goal: Plan of Care Review  Outcome: Ongoing (interventions implemented as appropriate)  Flowsheets (Taken 2/12/2020 0402)  Progress: no change  Plan of Care Reviewed With: patient  Patient Agreement with Plan of Care: agrees  Outcome Summary: Patient much more calm and cooperative with staff. Rates anxiety and depression both a 8. Denies SI, HI, or AVH.

## 2020-02-12 NOTE — DISCHARGE SUMMARY
Date of Discharge:  2/12/2020    Discharge Diagnosis:Principal Problem:    Other recurrent depressive disorders (CMS/HCC)  Active Problems:    Crohn's disease (CMS/HCC)    Chronic neck pain    Gastroparesis    Cluster B personality disorder (CMS/HCC)        Presenting Problem/History of Present Illness:Patient was admitted to the hospital on   having presented depressed verbalizing suicidal intent, voluntarily admitted for safety evaluation and treatment, see admission note for details.         Hospital Course:  Patient was admitted for safety and stabilization and was placed on standard precautions.  Routine labs were checked.  Patient was assigned a masters level therapist and provided with an opportunity to participate in group and individual therapy on the unit.  Patient seen on a daily basis for evaluation and supportive therapy.    Consults:   Consults     Date and Time Order Name Status Description    2/11/2020 1708 Inpatient Hospitalist Consult      2/11/2020 1632 Inpatient Hospitalist Consult            Labs:  Lab Results (all)     None          Imaging:  Imaging Results (All)     None                  Condition on Discharge:  guarded    Prognosis: poor    Vital Signs  Temp:  [97.8 °F (36.6 °C)-99.1 °F (37.3 °C)] 98.1 °F (36.7 °C)  Heart Rate:  [100-129] 125  Resp:  [16-20] 18  BP: (125-153)/(66-96) 125/77    Discharge Disposition  Rehab Facility or Unit (DC - External)    Discharge Medications     Discharge Medications      Continue These Medications      Instructions Start Date   clonazePAM 2 MG tablet  Commonly known as:  KlonoPIN   2 mg, Oral, 3 Times Daily PRN      gabapentin 600 MG tablet  Commonly known as:  NEURONTIN   600 mg, Oral, 3 Times Daily      ondansetron ODT 4 MG disintegrating tablet  Commonly known as:  ZOFRAN-ODT   4 mg, Oral, Every 8 Hours PRN      oxyCODONE-acetaminophen  MG per tablet  Commonly known as:  PERCOCET   1 tablet, Oral, 2 Times Daily      promethazine 25 MG  tablet  Commonly known as:  PHENERGAN   1 tablet, Oral, Every 6 Hours PRN             Discharge Diet: Regular     Activity at Discharge:  no restrictions    Follow-up: Patient and spouse driving to [place] where the patient plans to check into the GI clinic.of the [facility]       Darron Baker MD  02/12/20  1:38 PM  Time spent with the discharge process >30 minutes.     Dictated utilizing Dragon dictation

## 2020-02-12 NOTE — PLAN OF CARE
"  Problem: Patient Care Overview  Goal: Interprofessional Rounds/Family Conf  Outcome: Ongoing (interventions implemented as appropriate)  Flowsheets  Taken 2/11/2020 1348  Participants: milieu/psych techs;patient;social work;nursing;family;psychiatrist  Taken 2/12/2020 1141  Summary: Treatment team staffing and patient evaluation          1050:      Therapist staffed case with Dr. Baker.  Patient insistent that her gastroparesis is severe and will necessitate intravenous feeding and that her  has agreed to take her directly to the [facility] upon discharge.  Dr. Baker asked that therapist contact spouse to clarify.  Therapist contacted spouse Velasquez Costa at [phone]; no answer this date.  Will call back this afternoon as apparently Velasquez works at night and sleeps late into the day.  Today, patient withdrawn to bed due to complaints of Fibromyalgia.  Therapist visited patient at bedside to provide emotional support.  Patient focused on discharge and demonstrates little insight. Patient reports, \"I came here voluntarily and I will contact the police if I am held here against my will.  I need to go to a medical doctor. I need out of here today.\"  Patient reports that she needs help going to [facility]. She reports that her  can take her to [facility] directly today. Patient adamantly denies SI/HI.  Patient appears negatively impacted by Cluster B Personality Disorder at this time. She has been recommended to seek [facility], but refuses.  Patient signed consent for ongoing therapy with Aubrey Mercado and is scheduled for 2/19.      1310:     Therapist was able to reach patient's spouse Velasquez Costa at [phone]; Reviewed with Velasquez the patient's insistence on discharge and lack of insight regarding psychiatric help.  He verbalized understanding. Velasquez reports that he is agreeable to patient returning home and that he would stay with patient 24/7 and help her go directly to " [facility]. He reports that the home is safe guarded from weapons and objects of harm. Velasquez was agreeable to monitor all medications.      Therapist contacted Dr. Baker and provided update this date. Patient is requesting discharge. Awaiting to hear back from Dr. Baker to see if that will be today or tomorrow.     1414:  Patient discharged this date . Therapist contacted patient's spouse and reviewed. He was agreeable to pick patient up later today and plans to take her to [facility]. Therapist reviewed patient's discharge diagnosis and encouraged intensive therapy for her Cluster B Personality Disorder and Depressive disorder diagnosis'.  Encouraged patient/spouse to contact the hospital if she changes her mind about MH IOP in the future.  Patient's spouse agreeable. Encouraged spouse not to hesitate if patient's symptoms worsened to the extent that she needed to go back to the ED.  Velasquez verbalized understanding.

## 2020-02-13 NOTE — DISCHARGE SUMMARY
Date of Discharge:  2/13/2020    Discharge Diagnosis:Principal Problem:    Other recurrent depressive disorders (CMS/HCC)  Active Problems:    Crohn's disease (CMS/HCC)    Chronic neck pain    Gastroparesis    Cluster B personality disorder (CMS/HCC)        Presenting Problem/History of Present Illness:Patient was admitted to the hospital on February 10 having presented depressed verbalizing suicidal intent, voluntarily admitted for safety evaluation and treatment, see admission note for details.         Hospital Course:  Patient was admitted for safety and stabilization and was placed on standard precautions.  Routine labs were checked.  Patient was assigned a masters level therapist and provided with an opportunity to participate in group and individual therapy on the unit.  Patient seen on a daily basis for evaluation and supportive therapy.  Patient remained primarily somatic preoccupied primarily with her difficulty with taking in oral fluids or solids.  See extended progress note written on February 12.  Subsequently the disposition plan was determined, her  is to take her to the gastroenterology section of the [facility] the very day of discharge to address her most immediate somatic issue.  She was denying any thoughts of harming self or others, her affect was more angry and dramatic than depressed, and she certainly had no psychotic thought determinants.  No additional, new, psychotropic medications prescribed.    Consults:   Hospitalist consult requested but declined.    Labs:  See results    Imaging:  Imaging Results (All)     None        Condition on Discharge:  guarded    Prognosis: Poor    Vital Signs       Discharge Disposition  Rehab Facility or Unit (DC - External)    Discharge Medications     Discharge Medications      Continue These Medications      Instructions Start Date   clonazePAM 2 MG tablet  Commonly known as:  KlonoPIN   2 mg, Oral, 3 Times Daily PRN      gabapentin 600 MG  tablet  Commonly known as:  NEURONTIN   600 mg, Oral, 3 Times Daily      ondansetron ODT 4 MG disintegrating tablet  Commonly known as:  ZOFRAN-ODT   4 mg, Oral, Every 8 Hours PRN      oxyCODONE-acetaminophen  MG per tablet  Commonly known as:  PERCOCET   1 tablet, Oral, 2 Times Daily      promethazine 25 MG tablet  Commonly known as:  PHENERGAN   1 tablet, Oral, Every 6 Hours PRN             Discharge Diet: Patient declined to talk to the dietitian         Activity at Discharge:   No restrictions    Follow-up Appointments: Patient and her  are traveling to [place] day of discharge hoping to see a gastroenterologist at the [facility].          Darron Baker MD  02/13/20  8:08 AM  Time spent with the discharge process >30 minutes.     Dictated utilizing Dragon dictation

## 2020-02-27 ENCOUNTER — TRANSCRIBE ORDERS (OUTPATIENT)
Dept: LAB | Facility: HOSPITAL | Age: 60
End: 2020-02-27

## 2020-02-27 DIAGNOSIS — R10.2 PELVIC PAIN: Primary | ICD-10-CM

## 2020-03-02 ENCOUNTER — TRANSCRIBE ORDERS (OUTPATIENT)
Dept: LAB | Facility: HOSPITAL | Age: 60
End: 2020-03-02

## 2020-03-02 DIAGNOSIS — M54.2 NECK PAIN: ICD-10-CM

## 2020-03-02 DIAGNOSIS — M25.512 LEFT SHOULDER PAIN, UNSPECIFIED CHRONICITY: Primary | ICD-10-CM

## 2020-03-05 ENCOUNTER — APPOINTMENT (OUTPATIENT)
Dept: ULTRASOUND IMAGING | Facility: HOSPITAL | Age: 60
End: 2020-03-05

## 2020-03-05 ENCOUNTER — APPOINTMENT (OUTPATIENT)
Dept: GENERAL RADIOLOGY | Facility: HOSPITAL | Age: 60
End: 2020-03-05

## 2020-03-19 ENCOUNTER — HOSPITAL ENCOUNTER (OUTPATIENT)
Dept: GENERAL RADIOLOGY | Facility: HOSPITAL | Age: 60
Discharge: HOME | End: 2020-03-19
Admitting: FAMILY MEDICINE

## 2020-03-19 DIAGNOSIS — M54.2 NECK PAIN: ICD-10-CM

## 2020-03-19 PROCEDURE — 72050 X-RAY EXAM NECK SPINE 4/5VWS: CPT

## 2021-02-22 DIAGNOSIS — Z23 IMMUNIZATION DUE: ICD-10-CM
